# Patient Record
Sex: FEMALE | Race: WHITE | Employment: FULL TIME | URBAN - NONMETROPOLITAN AREA
[De-identification: names, ages, dates, MRNs, and addresses within clinical notes are randomized per-mention and may not be internally consistent; named-entity substitution may affect disease eponyms.]

---

## 2017-01-06 ENCOUNTER — TELEPHONE (OUTPATIENT)
Dept: FAMILY MEDICINE | Facility: OTHER | Age: 42
End: 2017-01-06

## 2017-01-06 NOTE — TELEPHONE ENCOUNTER
Patient is calling and states that she rescheduled her physical for 1/19/16. Patient had labs drawn 12/9. Is there any additional labs that you would like prior to her physical? If yes, please place orders as patient would like to do prior to appointment  .Leann Ann LPN

## 2017-01-19 ENCOUNTER — OFFICE VISIT (OUTPATIENT)
Dept: FAMILY MEDICINE | Facility: OTHER | Age: 42
End: 2017-01-19
Attending: FAMILY MEDICINE
Payer: COMMERCIAL

## 2017-01-19 VITALS
HEIGHT: 64 IN | RESPIRATION RATE: 16 BRPM | BODY MASS INDEX: 31.41 KG/M2 | SYSTOLIC BLOOD PRESSURE: 104 MMHG | HEART RATE: 68 BPM | WEIGHT: 184 LBS | DIASTOLIC BLOOD PRESSURE: 68 MMHG

## 2017-01-19 DIAGNOSIS — N89.8 VAGINAL DISCHARGE: ICD-10-CM

## 2017-01-19 DIAGNOSIS — Z00.00 ROUTINE GENERAL MEDICAL EXAMINATION AT A HEALTH CARE FACILITY: Primary | ICD-10-CM

## 2017-01-19 DIAGNOSIS — E78.1 HIGH TRIGLYCERIDES: ICD-10-CM

## 2017-01-19 LAB
MICRO REPORT STATUS: NORMAL
SPECIMEN SOURCE: NORMAL
WET PREP SPEC: NORMAL

## 2017-01-19 PROCEDURE — 87624 HPV HI-RISK TYP POOLED RSLT: CPT | Performed by: FAMILY MEDICINE

## 2017-01-19 PROCEDURE — 99396 PREV VISIT EST AGE 40-64: CPT | Performed by: FAMILY MEDICINE

## 2017-01-19 PROCEDURE — G0476 HPV COMBO ASSAY CA SCREEN: HCPCS | Performed by: FAMILY MEDICINE

## 2017-01-19 PROCEDURE — 99000 SPECIMEN HANDLING OFFICE-LAB: CPT | Performed by: FAMILY MEDICINE

## 2017-01-19 PROCEDURE — G0123 SCREEN CERV/VAG THIN LAYER: HCPCS | Performed by: FAMILY MEDICINE

## 2017-01-19 PROCEDURE — 87210 SMEAR WET MOUNT SALINE/INK: CPT | Performed by: FAMILY MEDICINE

## 2017-01-19 ASSESSMENT — PAIN SCALES - GENERAL: PAINLEVEL: MILD PAIN (3)

## 2017-01-19 NOTE — NURSING NOTE
"Chief Complaint   Patient presents with     Physical       Initial /68 mmHg  Pulse 68  Resp 16  Ht 5' 4\" (1.626 m)  Wt 184 lb (83.462 kg)  BMI 31.57 kg/m2  LMP 01/12/2017  Breastfeeding? No Estimated body mass index is 31.57 kg/(m^2) as calculated from the following:    Height as of this encounter: 5' 4\" (1.626 m).    Weight as of this encounter: 184 lb (83.462 kg).  BP completed using cuff size: katy Bright    "

## 2017-01-19 NOTE — Clinical Note
[facility] [place]  [address]  [facility] [address]  [phone]      January 30, 2017    Vida Mederos  [address] [place] [id number]          Dear Vida,    I am happy to inform you that your recent cervical cancer screening test (PAP smear) was normal.      Preventative screening such as this helps insure your health for years to come.  This test should be repeated in 1 year unless otherwise directed.    You will still need to return to the clinic every year for your annual exam and other preventive tests.    Please contact the clinic if you have further questions.      Sincerely,    Razia Levy MD

## 2017-01-19 NOTE — PROGRESS NOTES
Female History and Physical     Vida Mederos MRN# [id number]   YOB: 1975 Age: 41 year old     Primary care provider: Razia Levy                 Chief Complaint:   Vaginal discharge and recently threw her back out.    History is obtained from the patient         History of Present Illness:   This patient is a 41 year old female who presents for CPE and above             Past Medical History:     Past Medical History   Diagnosis Date     Threatened premature labor, antepartum 2008     Twin pregnancy, antepartum condition or complication 3/31/2008     Obesity      Gestational diabetes      Lumbar degenerative disc disease      had fallen and was hospitalized for pain             Past Surgical History:     Past Surgical History   Procedure Laterality Date      section  2008     D & c       3 months SAB      section                Gynecologic History:   Patient's last menstrual period was 2017.  Menses:   age of menarche:  11 years old  interval:  4 weeks  duration:  6 day(s)  Contraception:   Abstinence  Sexually transmitted disease history:  none  PAP smear history:  Last PAP was normal.       Last mammogram:  Mammogram was normal.          Obstetrical History:   See Epic         Social History:     Social History   Substance Use Topics     Smoking status: Never Smoker      Smokeless tobacco: Never Used     Alcohol Use: 0.0 oz/week     0 Standard drinks or equivalent per week      Comment: 2x/week             Family History:     Family History   Problem Relation Age of Onset     DIABETES Paternal Grandmother      DIABETES Maternal Grandmother      DIABETES Mother      Prostate Cancer Father      ? vs BPH     GERD Father      has lost teeth     Other Cancer Sister      abnormal cells?     Other - See Comments Sister      hyst     Family History Negative Sister      Family History Negative Sister      Family History Negative Brother      Family History  "Negative Brother              Immunizations:     Immunization History   Administered Date(s) Administered     Pneumococcal 23 valent 03/31/2008     TD (ADULT, 7+) 03/31/2008     TDAP (BOOSTRIX AGES 10-64) 10/22/2012            Allergies:   No Known Allergies          Medications:     Current outpatient prescriptions:      naproxen (NAPROSYN) 500 MG tablet, Take 1 tablet (500 mg) by mouth 2 times daily as needed for moderate pain, Disp: 30 tablet, Rfl: 0            Review of Systems:   A comprehensive, 10 point review of systems was performed and found to be negative except: as above              Physical Exam:   Vitals were reviewed  Blood pressure 104/68, pulse 68, resp. rate 16, height 5' 4\" (1.626 m), weight 184 lb (83.462 kg), last menstrual period 01/12/2017, not currently breastfeeding.  Constitutional:   awake, alert, cooperative, no apparent distress, and appears stated age   Eyes:   Lids and lashes normal, pupils equal, round and reactive to light, extra ocular muscles intact, sclera clear, conjunctiva normal   ENT:   Normocephalic, without obvious abnormality, atramatic, external ears without lesions, oral pharynx with moist mucus membranes, tonsils without erythema or exudates, gums normal and good dentition.   Neck:   Supple, symmetrical, trachea midline, no adenopathy, thyroid symmetric, not enlarged and no tenderness, skin normal   Hematologic / Lymphatic:   no cervical lymphadenopathy   Lungs:   No increased work of breathing, good air exchange, clear to auscultation bilaterally, no crackles or wheezing   Cardiovascular:   Normal apical impulse, regular rate and rhythm, normal S1 and S2, no S3 or S4, and no murmur noted   Abdomen:   surgical scars, tender epigastrium, normal bowel sounds, soft, non-distended, non-tender, no masses palpated, no hepatosplenomegally   Chest / Breast:   Breasts symmetrical, skin without lesion(s), no nipple retraction or dimpling, no nipple discharge, no masses palpated, " no axillary or supraclavicular adenopathy   Genitounirinary:   External Genitalia:  General appearance; normal  Urethra:  Fullness absent  Bladder:  Fullness absent, Masses absent  Vagina:  General appearance normal  Cervix:  General appearance normal, pap done, wet prep done  Uterus:  Size normal, Contour normal  Adenexa:  Masses absent, Tenderness absent   Musculoskeletal:   There is no redness, warmth, or swelling of the joints.  Full range of motion noted.  Motor strength is 5 out of 5 all extremities bilaterally.  Tone is normal.   Neurologic:   Awake, alert, oriented to name, place and time.  Cranial nerves II-XII are grossly intact.  Motor is 5 out of 5 bilaterally.  Sensory is intact.  and gait is normal.   Neuropsychiatric:   General: normal, calm and normal eye contact  Level of consciousness: alert / normal  Affect: normal and pleasant  Orientation: oriented to self, place, time and situation  Memory and insight: normal, memory for past and recent events intact and thought process normal   Skin:   no bruising or bleeding, Body Locations:  No rashes          Data:   No visits with results within 1 Day(s) from this visit.  Latest known visit with results is:    Orders Only on 12/09/2016   Component Date Value Ref Range Status     Cholesterol 12/09/2016 169  <200 mg/dL Final     Triglycerides 12/09/2016 182* <150 mg/dL Final    Comment: Borderline high:  150-199 mg/dl   High:             200-499 mg/dl   Very high:       >499 mg/dl   Fasting specimen       HDL Cholesterol 12/09/2016 55  >49 mg/dL Final     LDL Cholesterol Calculated 12/09/2016 78  <100 mg/dL Final    Desirable:       <100 mg/dl     Non HDL Cholesterol 12/09/2016 114  <130 mg/dL Final     Sodium 12/09/2016 138  133 - 144 mmol/L Final     Potassium 12/09/2016 3.9  3.4 - 5.3 mmol/L Final     Chloride 12/09/2016 104  94 - 109 mmol/L Final     Carbon Dioxide 12/09/2016 26  20 - 32 mmol/L Final     Anion Gap 12/09/2016 8  3 - 14 mmol/L Final      Glucose 12/09/2016 105* 70 - 99 mg/dL Final    Fasting specimen     Urea Nitrogen 12/09/2016 19  7 - 30 mg/dL Final     Creatinine 12/09/2016 0.77  0.52 - 1.04 mg/dL Final     GFR Estimate 12/09/2016 82  >60 mL/min/1.7m2 Final    Non  GFR Calc     GFR Estimate If Black 12/09/2016   >60 mL/min/1.7m2 Final                    Value:>90   GFR Calc       Calcium 12/09/2016 8.8  8.5 - 10.1 mg/dL Final     Bilirubin Total 12/09/2016 0.4  0.2 - 1.3 mg/dL Final     Albumin 12/09/2016 4.0  3.4 - 5.0 g/dL Final     Protein Total 12/09/2016 7.6  6.8 - 8.8 g/dL Final     Alkaline Phosphatase 12/09/2016 72  40 - 150 U/L Final     ALT 12/09/2016 28  0 - 50 U/L Final     AST 12/09/2016 19  0 - 45 U/L Final     Rheumatoid Factor 12/09/2016 <20  <20 IU/mL Final     Cyclic Citrullinated Peptide Antib* 12/09/2016 1  <7 U/mL Final    Negative     TSH 12/09/2016 2.30  0.40 - 4.00 mU/L Final     CK Screen by EIA 12/09/2016   <1.0 Final                    Value:<1.0  Interpretation:  Negative       Hemoglobin A1C 12/09/2016 5.4  4.3 - 6.0 % Final     Vitamin B12 12/09/2016 727  193 - 986 pg/mL Final    Interp: 247-911 = Normal     Estimated Average Glucose 12/09/2016 108   Final   ]         Assessment and Plan:   (Z00.00) Routine general medical examination at a health care facility  (primary encounter diagnosis)  Comment:   Plan: A pap thin layer screen with  HPV - recommended        age 30 - 65 years (select HPV order below), HPV        High Risk Types DNA Cervical        F/u 1 yr    (N89.8) Vaginal discharge  Comment:   Plan: Wet prep          (E78.1) High triglycerides  Comment:   Plan: increase fish oil    GERD  Plan:  Zantac or prilosec prn  F/u if not improving.  Anti-inflammatory diet       Patient was agreeable to this plan and had no further questions.    Razia Levy MD  1/19/2017  1:37 PM

## 2017-01-19 NOTE — MR AVS SNAPSHOT
"              After Visit Summary   2017    Vida Mederos    MRN: [id number]           Patient Information     Date Of Birth          1975        Visit Information        Provider Department      2017 1:30 PM Razia Levy MD [facility]        Today's Diagnoses     Routine general medical examination at a health care facility    -  1     Vaginal discharge         High triglycerides           Care Instructions    Whole 30        Follow-ups after your visit        Who to contact     If you have questions or need follow up information about today's clinic visit or your schedule please contact [facility] directly at [phone].  Normal or non-critical lab and imaging results will be communicated to you by GainSpanhart, letter or phone within 4 business days after the clinic has received the results. If you do not hear from us within 7 days, please contact the clinic through GainSpanhart or phone. If you have a critical or abnormal lab result, we will notify you by phone as soon as possible.  Submit refill requests through Corsair or call your pharmacy and they will forward the refill request to us. Please allow 3 business days for your refill to be completed.          Additional Information About Your Visit        MyChart Information     Corsair lets you send messages to your doctor, view your test results, renew your prescriptions, schedule appointments and more. To sign up, go to [email or web address] . Click on \"Log in\" on the left side of the screen, which will take you to the Welcome page. Then click on \"Sign up Now\" on the right side of the page.     You will be asked to enter the access code listed below, as well as some personal information. Please follow the directions to create your username and password.     Your access code is: [email or web address]  Expires: 3/8/2017  3:19 PM     Your access code will  in 90 days. If you need help or a new code, please call your " "[facility] or [phone].        Care EveryWhere ID     This is your Care EveryWhere ID. This could be used by other organizations to access your [facility] medical records  [id number]        Your Vitals Were     Pulse Respirations Height BMI (Body Mass Index) Last Period Breastfeeding?    68 16 5' 4\" (1.626 m) 31.57 kg/m2 01/12/2017 No       Blood Pressure from Last 3 Encounters:   01/19/17 104/68   12/08/16 106/66   06/03/16 102/64    Weight from Last 3 Encounters:   01/19/17 184 lb (83.462 kg)   12/08/16 189 lb (85.73 kg)   06/03/16 185 lb (83.915 kg)              We Performed the Following     A pap thin layer screen with  HPV - recommended age 30 - 65 years (select HPV order below)     HPV High Risk Types DNA Cervical     Wet prep          Today's Medication Changes          These changes are accurate as of: 1/19/17  2:00 PM.  If you have any questions, ask your nurse or doctor.               Stop taking these medicines if you haven't already. Please contact your care team if you have questions.     vitamin D 01370 UNIT capsule   Commonly known as:  ERGOCALCIFEROL   Stopped by:  Razia Levy MD                    Primary Care Provider Office Phone # Fax #    Razia Levy [phone]       [facility] [place] [address] [place] [address]  [facility] [address]        Thank you!     Thank you for choosing [facility]  for your care. Our goal is always to provide you with excellent care. Hearing back from our patients is one way we can continue to improve our services. Please take a few minutes to complete the written survey that you may receive in the mail after your visit with us. Thank you!             Your Updated Medication List - Protect others around you: Learn how to safely use, store and throw away your medicines at www.disposemymeds.org.          This list is accurate as of: 1/19/17  2:00 PM.  Always use your most recent med list.                   Brand Name Dispense " Instructions for use    naproxen 500 MG tablet    NAPROSYN    30 tablet    Take 1 tablet (500 mg) by mouth 2 times daily as needed for moderate pain

## 2017-01-23 DIAGNOSIS — Z12.31 VISIT FOR SCREENING MAMMOGRAM: Primary | ICD-10-CM

## 2017-01-23 PROCEDURE — G0202 SCR MAMMO BI INCL CAD: HCPCS | Mod: TC

## 2017-01-30 LAB
COPATH REPORT: NORMAL
PAP: NORMAL

## 2017-01-31 LAB
FINAL DIAGNOSIS: NORMAL
HPV HR 12 DNA CVX QL NAA+PROBE: NEGATIVE
HPV16 DNA SPEC QL NAA+PROBE: NEGATIVE
HPV18 DNA SPEC QL NAA+PROBE: NEGATIVE
SPECIMEN DESCRIPTION: NORMAL

## 2017-12-19 ENCOUNTER — OFFICE VISIT (OUTPATIENT)
Dept: FAMILY MEDICINE | Facility: OTHER | Age: 42
End: 2017-12-19
Attending: FAMILY MEDICINE
Payer: COMMERCIAL

## 2017-12-19 VITALS
TEMPERATURE: 98.3 F | SYSTOLIC BLOOD PRESSURE: 108 MMHG | HEART RATE: 78 BPM | DIASTOLIC BLOOD PRESSURE: 72 MMHG | BODY MASS INDEX: 33.99 KG/M2 | WEIGHT: 198 LBS | OXYGEN SATURATION: 98 %

## 2017-12-19 DIAGNOSIS — M26.609 TMJ (TEMPOROMANDIBULAR JOINT SYNDROME): Primary | ICD-10-CM

## 2017-12-19 PROCEDURE — 99212 OFFICE O/P EST SF 10 MIN: CPT

## 2017-12-19 PROCEDURE — 99213 OFFICE O/P EST LOW 20 MIN: CPT | Performed by: FAMILY MEDICINE

## 2017-12-19 ASSESSMENT — PAIN SCALES - GENERAL: PAINLEVEL: MODERATE PAIN (4)

## 2017-12-19 NOTE — PROGRESS NOTES
SUBJECTIVE:   Vida Mederos is a 42 year old female who presents to clinic today for the following health issues:      Ear Pain      Duration: started today    Description (location/character/radiation): right side, upper jaw pain- patient believes it her ear. Did go swimming yesterday    Intensity:  moderate    Accompanying signs and symptoms: headache, pain with chewing     History (similar episodes/previous evaluation): None    Precipitating or alleviating factors: None    Therapies tried and outcome: acetaminophen 500mg    Denies any fever or sore throat.  No pain in her teeth chewing but pain is just anterior to the right ear.  No ear drainage or ringing in the ears or weakness in her face       [facility]    Vida Mederos, 42 year old, female presents with   Chief Complaint   Patient presents with     Otalgia       PAST MEDICAL HISTORY:  Past Medical History:   Diagnosis Date     Gestational diabetes      Lumbar degenerative disc disease     had fallen and was hospitalized for pain     Obesity      Threatened premature labor, antepartum(644.03) 2008     Twin pregnancy, antepartum condition or complication 3/31/2008       PAST SURGICAL HISTORY:  Past Surgical History:   Procedure Laterality Date      SECTION  2008      SECTION       D & C      3 months SAB       MEDICATIONS:  Prior to Admission medications    Medication Sig Start Date End Date Taking? Authorizing Provider   naproxen (NAPROSYN) 500 MG tablet Take 1 tablet (500 mg) by mouth 2 times daily as needed for moderate pain 16  Yes Razia Levy MD       ALLERGIES:   No Known Allergies    ROS:  Constitutional, HEENT, cardiovascular, pulmonary, gi and gu systems are negative, except as otherwise noted.        EXAM:/72 (BP Location: Left arm, Patient Position: Chair, Cuff Size: Adult Large)  Pulse 78  Temp 98.3  F (36.8  C) (Tympanic)  Wt 198 lb (89.8 kg)  SpO2 98%  BMI 33.99  kg/m2 Body mass index is 33.99 kg/(m^2).   GENERAL APPEARANCE: healthy, alert and no distress  EYES: Eyes grossly normal to inspection, PERRL and conjunctivae and sclerae normal  HENT: ear canals and TM's normal and nose and mouth without ulcers or lesions  NECK: no adenopathy, no asymmetry, masses, or scars and thyroid normal to palpation  RESP: lungs clear to auscultation - no rales, rhonchi or wheezes  CV: regular rates and rhythm, normal S1 S2, no S3 or S4 and no murmur, click or rub  Lab/ X-ray  No results found for this or any previous visit (from the past 24 hour(s)).    ASSESSMENT/PLAN:    ICD-10-CM    1. TMJ (temporomandibular joint syndrome) M26.609  she will rest from chewing a lot and take ibuprofen 2 tablets of the 200 mg 3 times a day with food for 5 days.  She can try hot packs on the right side.  Follow-up if symptoms progress despite treatment or do not resolve after treatment.  Note she had no facial weakness and no cervical adenopathy and no swelling anterior to the ear or redness and auditory canal had minimal redness but the TM looked totally normal         LEORA Aponte MD  December 19, 2017

## 2017-12-19 NOTE — MR AVS SNAPSHOT
"              After Visit Summary   2017    Vida eMderos    MRN: [id number]           Patient Information     Date Of Birth          1975        Visit Information        Provider Department      2017 2:45 PM LAURI Aponte MD [facility]        Care Instructions    Ibuprofen 200 mg three times a day for 5 days, rest the Lutheran, hot packs,          Follow-ups after your visit        Who to contact     If you have questions or need follow up information about today's clinic visit or your schedule please contact [facility] directly at [phone].  Normal or non-critical lab and imaging results will be communicated to you by Narrablehart, letter or phone within 4 business days after the clinic has received the results. If you do not hear from us within 7 days, please contact the clinic through Narrablehart or phone. If you have a critical or abnormal lab result, we will notify you by phone as soon as possible.  Submit refill requests through Sosh or call your pharmacy and they will forward the refill request to us. Please allow 3 business days for your refill to be completed.          Additional Information About Your Visit        MyChart Information     Sosh lets you send messages to your doctor, view your test results, renew your prescriptions, schedule appointments and more. To sign up, go to [email or web address] . Click on \"Log in\" on the left side of the screen, which will take you to the Welcome page. Then click on \"Sign up Now\" on the right side of the page.     You will be asked to enter the access code listed below, as well as some personal information. Please follow the directions to create your username and password.     Your access code is: [email or web address]  Expires: 3/19/2018  2:48 PM     Your access code will  in 90 days. If you need help or a new code, please call your [facility] or [phone].        Care EveryWhere ID     This is your Care " EveryWhere ID. This could be used by other organizations to access your [facility] medical records  [id number]        Your Vitals Were     Pulse Temperature Pulse Oximetry BMI (Body Mass Index)          78 98.3  F (36.8  C) (Tympanic) 98% 33.99 kg/m2         Blood Pressure from Last 3 Encounters:   12/19/17 108/72   01/19/17 104/68   12/08/16 106/66    Weight from Last 3 Encounters:   12/19/17 198 lb (89.8 kg)   01/19/17 184 lb (83.5 kg)   12/08/16 189 lb (85.7 kg)              Today, you had the following     No orders found for display       Primary Care Provider Office Phone # Fax #    Razia Levy [phone]       [facility] [place] [address]  [facility] [place] [address]        Equal Access to Services     [facility]: Hadii aad ku hadasho Soomaali, waaxda luqadaha, qaybta kaalmada adeegyada, waxay idiin hayaan elsi stevenson . So [facility] [phone].    ATENCIÓN: Si habla español, tiene a rubio disposición servicios gratuitos de asistencia lingüística. Llame al [phone].    We comply with applicable federal civil rights laws and [place] laws. We do not discriminate on the basis of race, color, national origin, age, disability, sex, sexual orientation, or gender identity.            Thank you!     Thank you for choosing [facility]  for your care. Our goal is always to provide you with excellent care. Hearing back from our patients is one way we can continue to improve our services. Please take a few minutes to complete the written survey that you may receive in the mail after your visit with us. Thank you!             Your Updated Medication List - Protect others around you: Learn how to safely use, store and throw away your medicines at www.disposemymeds.org.          This list is accurate as of: 12/19/17  2:48 PM.  Always use your most recent med list.                   Brand Name Dispense Instructions for use Diagnosis    naproxen 500 MG tablet    NAPROSYN    30  tablet    Take 1 tablet (500 mg) by mouth 2 times daily as needed for moderate pain    Pain in joint, multiple sites

## 2017-12-19 NOTE — NURSING NOTE
"Chief Complaint   Patient presents with     Otalgia       Initial /72 (BP Location: Left arm, Patient Position: Chair, Cuff Size: Adult Large)  Pulse 78  Temp 98.3  F (36.8  C) (Tympanic)  Wt 198 lb (89.8 kg)  SpO2 98%  BMI 33.99 kg/m2 Estimated body mass index is 33.99 kg/(m^2) as calculated from the following:    Height as of 1/19/17: 5' 4\" (1.626 m).    Weight as of this encounter: 198 lb (89.8 kg).  Medication Reconciliation: completecomplete  YANIQUE GAN       "

## 2018-03-06 ENCOUNTER — HOSPITAL ENCOUNTER (EMERGENCY)
Facility: HOSPITAL | Age: 43
Discharge: HOME | End: 2018-03-06
Attending: NURSE PRACTITIONER | Admitting: NURSE PRACTITIONER
Payer: COMMERCIAL

## 2018-03-06 VITALS
SYSTOLIC BLOOD PRESSURE: 125 MMHG | DIASTOLIC BLOOD PRESSURE: 86 MMHG | OXYGEN SATURATION: 100 % | RESPIRATION RATE: 14 BRPM | TEMPERATURE: 97 F

## 2018-03-06 DIAGNOSIS — M26.609 TEMPOROMANDIBULAR JOINT DISORDER: ICD-10-CM

## 2018-03-06 PROCEDURE — 99213 OFFICE O/P EST LOW 20 MIN: CPT | Performed by: NURSE PRACTITIONER

## 2018-03-06 PROCEDURE — G0463 HOSPITAL OUTPT CLINIC VISIT: HCPCS

## 2018-03-06 ASSESSMENT — ENCOUNTER SYMPTOMS
FACIAL SWELLING: 0
FEVER: 0
COUGH: 0
RHINORRHEA: 0

## 2018-03-06 NOTE — ED NOTES
Bilateral ear pain Lt>Rt, started on Sunday, Lt ear pain 7/10, Rt ear not hurting at this time. Ibuprofen 400 mg at 1630.

## 2018-03-06 NOTE — ED AVS SNAPSHOT
[facility]    [address]    [place] [address]    Phone:  [phone]                                       Vida Mederos   MRN: [id number]    Department:  [facility]   Date of Visit:  3/6/2018           Patient Information     Date Of Birth          1975        Your diagnoses for this visit were:     Temporomandibular joint disorder        You were seen by Shayy Goodman NP.      Follow-up Information     Please follow up.    Why:  See Dentist if not improving        Discharge Instructions         Take ibuprofen 3 times a day with food.   Warm pack 3 times a day for 5-10 minutes.   Decrease stress.   Look into over the counter mouth guard for sleeping.   Follow up with dentist if not improving.     Self-Care for Temporomandibular Disorders (TMD)  You have temporomandibular disorder (TMD). This term describes a group of problems related to the temporomandibular joint (TMJ) and nearby muscles. The TMJ is located where the upper and lower jaws meet. Treatment will get your jaw back to normal function. But your care doesn t end there. Once you ve had TMD, it s important to avoid reinjury. Get in the habit of doing self-checks. This can make you aware of any symptoms that begin to return, so you can take action right away.    Doing self-checks  Make it a habit to assess your body a few times each day. Try writing yourself a reminder. Or set an alarm on your watch or computer. When doing a self-check, ask yourself:    Do I feel stressed?    Are my muscles tense?    Am I grinding or clenching my teeth?    Is my posture healthy for my body?    Is there anything I can do to make myself more comfortable?  If you answer  yes  to any of the questions above, you need to take action. Adjusting your posture or taking a short break can help prevent or relieve TMD symptoms.  Listening to your body  Many people get used to ignoring pain. But pain is a signal that your body  needs care. To maintain your TMJ health:    Avoid hard or chewy foods. Even if you feel fine, eating such foods can trigger symptoms again.    Be aware of your body. Don t ignore TMD symptoms. The nagging pain in your neck or jaw may be a sign that you need care.    Be sure to keep follow-up appointments with your health care team.  Managing stress  Stress is a key factor in TMD. Stress can cause you to clench your muscles or grind your teeth. It can also affect your sleep, reducing your body s ability to heal. Here are a few tips to manage stress:    Learn ways to relax. Try listening to music or gently stretching. Take a few slow deep breaths. Or, close your eyes and imagine a place or object that is calming.    Get plenty of rest and sleep.    Set goals you know you can attain.    Make time for people and things you enjoy.    Ask for help if you need it. Friends and family can run errands and cook meals for you.   Staying active  Activity helps the body in many ways. You stay looser and more relaxed. It also helps keep muscles and tissues conditioned. That way you can heal faster and make reinjury less likely. Here are some tips to get you started:    Talk to your health care provider before starting an exercise program.    Always warm up and stretch before each activity. This helps prevent injury.    Try walking or swimming. These activities are easy on your joints. They also benefit your heart and lungs.    Try yoga or yuliet chi. These are relaxing activities known for reducing stress.  Date Last Reviewed: 7/13/2015 2000-2017 [employer]. [address] [place] [address], [place] [address]. All rights reserved. This information is not intended as a substitute for professional medical care. Always follow your healthcare professional's instructions.             Review of your medicines      Our records show that you are taking the medicines listed below. If these are incorrect, please call your family doctor  "or clinic.        Dose / Directions Last dose taken    IBUPROFEN PO   Dose:  400 mg        Take 400 mg by mouth as needed for moderate pain   Refills:  0                Orders Needing Specimen Collection     None      Pending Results     No orders found from 3/4/2018 to 3/7/2018.            Pending Culture Results     No orders found from 3/4/2018 to 3/7/2018.            Thank you for choosing [facility]       Thank you for choosing [facility] for your care. Our goal is always to provide you with excellent care. Hearing back from our patients is one way we can continue to improve our services. Please take a few minutes to complete the written survey that you may receive in the mail after you visit with us. Thank you!        [employer] Information     [facility] lets you send messages to your doctor, view your test results, renew your prescriptions, schedule appointments and more. To sign up, go to [email or web address] . Click on \"Log in\" on the left side of the screen, which will take you to the Welcome page. Then click on \"Sign up Now\" on the right side of the page.     You will be asked to enter the access code listed below, as well as some personal information. Please follow the directions to create your username and password.     Your access code is: [email or web address]  Expires: 3/19/2018  2:48 PM     Your access code will  in 90 days. If you need help or a new code, please call your [facility] clinic or [phone].        Care EveryWhere ID     This is your Care EveryWhere ID. This could be used by other organizations to access your [facility] medical records  [id number]        Equal Access to Services     JERMAINE VILLEGAS : Hadmaryann Cornelius, waaxda luqadaha, qaybta kaalmada dania, marcela seo. So [facility] [place] [facility] [phone].    ATENCIÓN: Si habla español, tiene a rubio disposición servicios gratuitos de asistencia lingüística. Llame al [phone].    We comply with applicable federal " civil rights laws and [place] laws. We do not discriminate on the basis of race, color, national origin, age, disability, sex, sexual orientation, or gender identity.            After Visit Summary       This is your record. Keep this with you and show to your community pharmacist(s) and doctor(s) at your next visit.

## 2018-03-06 NOTE — ED AVS SNAPSHOT
[facility]    [address] [place] [address]    Phone:  [phone]                                       Vida Mederos   MRN: [id number]    Department[facility]  [facility]   Date of Visit:  3/6/2018           After Visit Summary Signature Page     I have received my discharge instructions, and my questions have been answered. I have discussed any challenges I see with this plan with the nurse or doctor.    ..........................................................................................................................................  Patient/Patient Representative Signature      ..........................................................................................................................................  Patient Representative Print Name and Relationship to Patient    ..................................................               ................................................  Date                                            Time    ..........................................................................................................................................  Reviewed by Signature/Title    ...................................................              ..............................................  Date                                                            Time

## 2018-03-07 NOTE — DISCHARGE INSTRUCTIONS
Take ibuprofen 3 times a day with food.   Warm pack 3 times a day for 5-10 minutes.   Decrease stress.   Look into over the counter mouth guard for sleeping.   Follow up with dentist if not improving.     Self-Care for Temporomandibular Disorders (TMD)  You have temporomandibular disorder (TMD). This term describes a group of problems related to the temporomandibular joint (TMJ) and nearby muscles. The TMJ is located where the upper and lower jaws meet. Treatment will get your jaw back to normal function. But your care doesn t end there. Once you ve had TMD, it s important to avoid reinjury. Get in the habit of doing self-checks. This can make you aware of any symptoms that begin to return, so you can take action right away.    Doing self-checks  Make it a habit to assess your body a few times each day. Try writing yourself a reminder. Or set an alarm on your watch or computer. When doing a self-check, ask yourself:    Do I feel stressed?    Are my muscles tense?    Am I grinding or clenching my teeth?    Is my posture healthy for my body?    Is there anything I can do to make myself more comfortable?  If you answer  yes  to any of the questions above, you need to take action. Adjusting your posture or taking a short break can help prevent or relieve TMD symptoms.  Listening to your body  Many people get used to ignoring pain. But pain is a signal that your body needs care. To maintain your TMJ health:    Avoid hard or chewy foods. Even if you feel fine, eating such foods can trigger symptoms again.    Be aware of your body. Don t ignore TMD symptoms. The nagging pain in your neck or jaw may be a sign that you need care.    Be sure to keep follow-up appointments with your health care team.  Managing stress  Stress is a key factor in TMD. Stress can cause you to clench your muscles or grind your teeth. It can also affect your sleep, reducing your body s ability to heal. Here are a few tips to manage  stress:    Learn ways to relax. Try listening to music or gently stretching. Take a few slow deep breaths. Or, close your eyes and imagine a place or object that is calming.    Get plenty of rest and sleep.    Set goals you know you can attain.    Make time for people and things you enjoy.    Ask for help if you need it. Friends and family can run errands and cook meals for you.   Staying active  Activity helps the body in many ways. You stay looser and more relaxed. It also helps keep muscles and tissues conditioned. That way you can heal faster and make reinjury less likely. Here are some tips to get you started:    Talk to your health care provider before starting an exercise program.    Always warm up and stretch before each activity. This helps prevent injury.    Try walking or swimming. These activities are easy on your joints. They also benefit your heart and lungs.    Try yoga or yuliet chi. These are relaxing activities known for reducing stress.  Date Last Reviewed: 7/13/2015 2000-2017 [employer]. [address] [place] [address], [place] [address]. All rights reserved. This information is not intended as a substitute for professional medical care. Always follow your healthcare professional's instructions.

## 2018-03-07 NOTE — ED PROVIDER NOTES
History     Chief Complaint   Patient presents with     Otalgia     lt ear pain     The history is provided by the patient. No  was used.     Vida Mederos is a 42 year old female who presents with bilateral ear pain, left much worse than right for past several days. Took a medication and oil drops at home that helped the right ear. No fever, no sinus congestion. No cough.     Problem List:    Patient Active Problem List    Diagnosis Date Noted     High triglycerides 2017     Priority: Medium     Pain in joint, multiple sites 2016     Priority: Medium     Encounter to establish care 2016     Priority: Medium     ACP (advance care planning) 2016     Priority: Medium     Advance Care Planning 6/3/2016: ACP Review of Chart / Resources Provided:  Reviewed chart for advance care plan.  Vida Mederos has no plan or code status on file. Discussed available resources and provided with information. Confirmed code status reflects current choices pending further ACP discussions.  Confirmed/documented legally designated decision makers.  Added by Kanika Biswas                Past Medical History:    Past Medical History:   Diagnosis Date     Gestational diabetes      Lumbar degenerative disc disease      Obesity      Threatened premature labor, antepartum(644.03) 2008     Twin pregnancy, antepartum condition or complication 3/31/2008       Past Surgical History:    Past Surgical History:   Procedure Laterality Date      SECTION  2008      SECTION       D & C  1999    3 months SAB       Family History:    Family History   Problem Relation Age of Onset     DIABETES Mother      Prostate Cancer Father      ? vs BPH     GERD Father      has lost teeth     DIABETES Maternal Grandmother      DIABETES Paternal Grandmother      Other Cancer Sister      abnormal cells?     Other - See Comments Sister      hyst     Family History Negative Sister       Family History Negative Sister      Family History Negative Brother      Family History Negative Brother        Social History:  Marital Status:   [2]  Social History   Substance Use Topics     Smoking status: Never Smoker     Smokeless tobacco: Never Used     Alcohol use 0.0 oz/week     0 Standard drinks or equivalent per week      Comment: twice a month        Medications:      IBUPROFEN PO         Review of Systems   Constitutional: Negative for fever.   HENT: Positive for ear pain. Negative for facial swelling, hearing loss, mouth sores, postnasal drip and rhinorrhea.    Respiratory: Negative for cough.        Physical Exam   BP: 125/86  Heart Rate: 62  Temp: 97  F (36.1  C)  Resp: 14  SpO2: 100 %      Physical Exam   Constitutional: She appears well-developed and well-nourished. No distress.   HENT:   Head: Normocephalic and atraumatic.       Right Ear: Tympanic membrane and external ear normal.   Left Ear: Tympanic membrane and external ear normal.   Nose: Nose normal. Right sinus exhibits no maxillary sinus tenderness and no frontal sinus tenderness. Left sinus exhibits no maxillary sinus tenderness and no frontal sinus tenderness.   Mouth/Throat: Uvula is midline, oropharynx is clear and moist and mucous membranes are normal. No oral lesions. There is trismus in the jaw. No dental abscesses or dental caries. No oropharyngeal exudate.   Eyes: Conjunctivae and lids are normal. No scleral icterus.   Neck: Normal range of motion. Neck supple.   Cardiovascular: Normal rate.    Pulmonary/Chest: Effort normal.   Skin: She is not diaphoretic.   Nursing note and vitals reviewed.      ED Course     ED Course     Procedures            Labs Ordered and Resulted from Time of ED Arrival Up to the Time of Departure from the ED - No data to display    Assessments & Plan (with Medical Decision Making)     I have reviewed the nursing notes.  I have reviewed the findings, diagnosis, plan and need for follow up with the  patient.  History of TMJ. TMs are clear, afebrile, no URI sx.  Treat as MSK.   Given Epic educational materials.     New Prescriptions    No medications on file     Final diagnoses:   Temporomandibular joint disorder     Take OTC ibuprofen TID, warm packs to face 5-10 min TID.   Get an OTC mouth guard for sleep at night.   Decrease stress.   F/u with dentist for re-evaluation.       3/6/2018   [facility]     Shayy Goodman NP  03/06/18 1920

## 2018-05-28 ENCOUNTER — HOSPITAL ENCOUNTER (EMERGENCY)
Facility: HOSPITAL | Age: 43
Discharge: HOME | End: 2018-05-28
Attending: NURSE PRACTITIONER | Admitting: NURSE PRACTITIONER
Payer: COMMERCIAL

## 2018-05-28 VITALS
SYSTOLIC BLOOD PRESSURE: 131 MMHG | RESPIRATION RATE: 14 BRPM | DIASTOLIC BLOOD PRESSURE: 76 MMHG | OXYGEN SATURATION: 97 % | TEMPERATURE: 98.9 F

## 2018-05-28 DIAGNOSIS — H81.10 BENIGN PAROXYSMAL POSITIONAL VERTIGO, UNSPECIFIED LATERALITY: ICD-10-CM

## 2018-05-28 PROCEDURE — 99213 OFFICE O/P EST LOW 20 MIN: CPT | Performed by: NURSE PRACTITIONER

## 2018-05-28 PROCEDURE — G0463 HOSPITAL OUTPT CLINIC VISIT: HCPCS

## 2018-05-28 PROCEDURE — 25000131 ZZH RX MED GY IP 250 OP 636 PS 637: Performed by: NURSE PRACTITIONER

## 2018-05-28 RX ORDER — MECLIZINE HYDROCHLORIDE 25 MG/1
25 TABLET ORAL ONCE
Status: COMPLETED | OUTPATIENT
Start: 2018-05-28 | End: 2018-05-28

## 2018-05-28 RX ORDER — MECLIZINE HYDROCHLORIDE 25 MG/1
25 TABLET ORAL 3 TIMES DAILY PRN
Qty: 10 TABLET | Refills: 0 | Status: SHIPPED | OUTPATIENT
Start: 2018-05-28 | End: 2018-06-03

## 2018-05-28 RX ADMIN — MECLIZINE HYDROCHLORIDE 25 MG: 25 TABLET ORAL at 16:29

## 2018-05-28 NOTE — ED AVS SNAPSHOT
[facility]    [address]    [place] [address]    Phone:  [phone]                                       Vida Mederos   MRN: [id number]    Department[facility]   Date of Visit:  5/28/2018           Patient Information     Date Of Birth          1975        Your diagnoses for this visit were:     Benign paroxysmal positional vertigo, unspecified laterality        You were seen by Alyse Kraft NP.      Follow-up Information     Follow up with Razia Levy MD In 2 days.    Specialty:  Family Practice    Why:  For re-evaluation.     Contact information:    [facility] [place]  [address]  [place] [address] [phone]          Follow up with [facility].    Specialty:  EMERGENCY MEDICINE    Why:  As needed, If symptoms worsen    Contact information:    [address]  [place] [address] [phone]    Additional information:    From [facility]: Take [address]. Turn left at [address]. Turn left at the first stoplight on [address]. At the first stop sign, take a right onto [address]. Take a left into the parking lot and continue through until you reach the North enterance of the building.       From [place]: Take [address]. Take the [address] ramp towards [place]. Turn left onto [address]. Take a slight right onto [address]. Turn left at the first stoplight on [address]. At the first stop sign, take a right onto [address]. Take a left into the parking lot and continue through until you reach the North enterance of the building.       From [place]: Take [address]. Take a right at [address]. At the first stop sign, take a right onto [address]. Take a left into the parking lot and continue through until you reach the North enterance of the building.         Discharge Instructions       Take Meclizine as needed as  "ordered.   Follow up with PCP this week.   Return to urgent care or emergency department with any increase in symptoms or concerns.     Discharge References/Attachments     BENIGN PAROXYSMAL POSITIONAL VERTIGO (ENGLISH)         Review of your medicines      START taking        Dose / Directions Last dose taken    meclizine 25 MG tablet   Commonly known as:  ANTIVERT   Dose:  25 mg   Quantity:  10 tablet        Take 1 tablet (25 mg) by mouth 3 times daily as needed for dizziness   Refills:  0          Our records show that you are taking the medicines listed below. If these are incorrect, please call your family doctor or clinic.        Dose / Directions Last dose taken    IBUPROFEN PO   Dose:  400 mg        Take 400 mg by mouth as needed for moderate pain   Refills:  0                Prescriptions were sent or printed at these locations (1 Prescription)                   [id number] - [place], [place] - [address] [address] [facility] [address]   [address], [place] [address]    Telephone:  [phone]   Fax:  [phone]   Hours:                  E-Prescribed (1 of 1)         meclizine (ANTIVERT) 25 MG tablet                Orders Needing Specimen Collection     None      Pending Results     No orders found from 5/26/2018 to 5/29/2018.            Pending Culture Results     No orders found from 5/26/2018 to 5/29/2018.            Thank you for choosing [facility]       Thank you for choosing [facility] for your care. Our goal is always to provide you with excellent care. Hearing back from our patients is one way we can continue to improve our services. Please take a few minutes to complete the written survey that you may receive in the mail after you visit with us. Thank you!        [employer] Information     [employer] lets you send messages to your doctor, view your test results, renew your prescriptions, schedule appointments and more. To sign up, go to [email or web address] . Click on \"Log in\" on the left side of the screen, which will " "take you to the Welcome page. Then click on \"Sign up Now\" on the right side of the page.     You will be asked to enter the access code listed below, as well as some personal information. Please follow the directions to create your username and password.     Your access code is: [email or web address]  Expires: 2018  5:01 PM     Your access code will  in 90 days. If you need help or a new code, please call your [facility] clinic or [phone].        Care EveryWhere ID     This is your Care EveryWhere ID. This could be used by other organizations to access your [facility] medical records  [id number]        Equal Access to Services     TRISH VILLEGAS : Devante Cornelius, jasvir flood, susi najera, marcela seo. So [facility] [phone].    ATENCIÓN: Si habla español, tiene a rubio disposición servicios gratuitos de asistencia lingüística. Llame al [phone].    We comply with applicable federal civil rights laws and [place] laws. We do not discriminate on the basis of race, color, national origin, age, disability, sex, sexual orientation, or gender identity.            After Visit Summary       This is your record. Keep this with you and show to your community pharmacist(s) and doctor(s) at your next visit.                  "

## 2018-05-28 NOTE — ED AVS SNAPSHOT
[facility]    [address] [place] [address]    Phone:  [phone]                                       Vida Mederos   MRN: [id number]    Department[facility]   Date of Visit:  5/28/2018           After Visit Summary Signature Page     I have received my discharge instructions, and my questions have been answered. I have discussed any challenges I see with this plan with the nurse or doctor.    ..........................................................................................................................................  Patient/Patient Representative Signature      ..........................................................................................................................................  Patient Representative Print Name and Relationship to Patient    ..................................................               ................................................  Date                                            Time    ..........................................................................................................................................  Reviewed by Signature/Title    ...................................................              ..............................................  Date                                                            Time

## 2018-05-28 NOTE — DISCHARGE INSTRUCTIONS
Take Meclizine as needed as ordered.   Follow up with PCP this week.   Return to urgent care or emergency department with any increase in symptoms or concerns.

## 2018-05-28 NOTE — ED PROVIDER NOTES
History     Chief Complaint   Patient presents with     Dizziness     c/o dizziness with movement. notes needs to hold her head still to prevent the dizziness     The history is provided by the patient. No  was used.     Vida Mederos is a 42 year old female who presents with dizziness with movement that started yesterday evening after playing Frisbee golf. Denies recent head injury or trauma. She's taken ibuprofen and dramamine with mild effectiveness. She's increased water intake. No recent illness. Denies fever, chills, or night sweats. Decreased appeite. Bowel and bladder are working well.     Problem List:    Patient Active Problem List    Diagnosis Date Noted     High triglycerides 2017     Priority: Medium     Pain in joint, multiple sites 2016     Priority: Medium     Encounter to establish care 2016     Priority: Medium     ACP (advance care planning) 2016     Priority: Medium     Advance Care Planning 6/3/2016: ACP Review of Chart / Resources Provided:  Reviewed chart for advance care plan.  Vida Mederos has no plan or code status on file. Discussed available resources and provided with information. Confirmed code status reflects current choices pending further ACP discussions.  Confirmed/documented legally designated decision makers.  Added by Kanika Biswas                Past Medical History:    Past Medical History:   Diagnosis Date     Gestational diabetes      Lumbar degenerative disc disease      Obesity      Threatened premature labor, antepartum(644.03) 2008     Twin pregnancy, antepartum condition or complication 3/31/2008       Past Surgical History:    Past Surgical History:   Procedure Laterality Date      SECTION  2008      SECTION       D & C  1999    3 months SAB       Family History:    Family History   Problem Relation Age of Onset     DIABETES Mother      Prostate Cancer Father      ? vs BPH     GERD  Father      has lost teeth     DIABETES Maternal Grandmother      DIABETES Paternal Grandmother      Other Cancer Sister      abnormal cells?     Other - See Comments Sister      hyst     Family History Negative Sister      Family History Negative Sister      Family History Negative Brother      Family History Negative Brother        Social History:  Marital Status:   [2]  Social History   Substance Use Topics     Smoking status: Never Smoker     Smokeless tobacco: Never Used     Alcohol use 0.0 oz/week     0 Standard drinks or equivalent per week      Comment: twice a month        Medications:      IBUPROFEN PO   meclizine (ANTIVERT) 25 MG tablet         Review of Systems   Constitutional: Positive for appetite change. Negative for activity change, chills and fever.   HENT: Negative for congestion, ear discharge, ear pain, postnasal drip, rhinorrhea, sinus pressure, sore throat and trouble swallowing.    Respiratory: Negative for cough.    Cardiovascular: Negative for chest pain.   Gastrointestinal: Negative for abdominal pain, diarrhea, nausea and vomiting.   Genitourinary: Negative for dysuria.   Skin: Negative for rash.   Neurological: Positive for dizziness. Negative for tremors, syncope, facial asymmetry, speech difficulty, weakness, numbness and headaches.   Psychiatric/Behavioral: Negative.        Physical Exam   BP: 131/76  Heart Rate: 95  Temp: 98.9  F (37.2  C)  Resp: 14  SpO2: 97 %      Physical Exam   Constitutional: She is oriented to person, place, and time. She appears well-developed and well-nourished. No distress.   HENT:   Head: Normocephalic.   Right Ear: External ear normal.   Left Ear: External ear normal.   Mouth/Throat: Oropharynx is clear and moist. No oropharyngeal exudate.   Eyes: Conjunctivae and EOM are normal. Pupils are equal, round, and reactive to light. Right eye exhibits no discharge. Left eye exhibits no discharge.   Neck: Normal range of motion. Neck supple.    Cardiovascular: Normal rate, regular rhythm, normal heart sounds and intact distal pulses.    No murmur heard.  Pulmonary/Chest: Effort normal. No respiratory distress. She has no wheezes. She has no rales.   Abdominal: Soft. She exhibits no distension.   Musculoskeletal: Normal range of motion.   Lymphadenopathy:     She has no cervical adenopathy.   Neurological: She is alert and oriented to person, place, and time. She displays normal reflexes. No cranial nerve deficit. She exhibits normal muscle tone. Coordination normal.   CN II-XII grossly intact.    Skin: Skin is warm and dry. No rash noted. She is not diaphoretic.   Psychiatric: She has a normal mood and affect. Her behavior is normal.   Nursing note and vitals reviewed.      ED Course     ED Course     Procedures    Medications   meclizine (ANTIVERT) tablet 25 mg (25 mg Oral Given 5/28/18 3010)     Meclizine improved dizziness.     Assessments & Plan (with Medical Decision Making)     Discussed plan of care. She verbalized understanding. All questions answered.     I have reviewed the nursing notes.    I have reviewed the findings, diagnosis, plan and need for follow up with the patient.  Discharged in stable condition.     New Prescriptions    MECLIZINE (ANTIVERT) 25 MG TABLET    Take 1 tablet (25 mg) by mouth 3 times daily as needed for dizziness       Final diagnoses:   Benign paroxysmal positional vertigo, unspecified laterality     Take Meclizine as needed as ordered.   Follow up with PCP this week.   Return to urgent care or emergency department with any increase in symptoms or concerns.     ISA Rogers  5/28/2018  4:20 PM  [facility]       Alyse Kraft NP  05/31/18 3566

## 2018-05-28 NOTE — ED NOTES
"C/o feeling dizzy since 5pm last night, pt states as long as she doesn't move she is not dizzy. Pt reports \"breathing in shower water\" on Saturday night and since then has a \"spot\" in her chest that hurts when she is coughing    "

## 2018-05-31 ASSESSMENT — ENCOUNTER SYMPTOMS
CHILLS: 0
DIZZINESS: 1
ABDOMINAL PAIN: 0
HEADACHES: 0
NUMBNESS: 0
FACIAL ASYMMETRY: 0
PSYCHIATRIC NEGATIVE: 1
DIARRHEA: 0
FEVER: 0
SINUS PRESSURE: 0
VOMITING: 0
NAUSEA: 0
TREMORS: 0
TROUBLE SWALLOWING: 0
SORE THROAT: 0
WEAKNESS: 0
APPETITE CHANGE: 1
RHINORRHEA: 0
COUGH: 0
ACTIVITY CHANGE: 0
DYSURIA: 0
SPEECH DIFFICULTY: 0

## 2018-06-01 ENCOUNTER — TELEPHONE (OUTPATIENT)
Dept: FAMILY MEDICINE | Facility: OTHER | Age: 43
End: 2018-06-01

## 2018-06-01 NOTE — TELEPHONE ENCOUNTER
3:53 PM    Reason for Call: OVERBOOK    Patient is having the following symptoms: 5  days.    The patient is requesting an appointment for dizziness with Dr. Levy.    Was an appointment offered for this call? No  If yes : Appointment type              Date    Preferred method for responding to this message: Telephone Call  What is your phone number ?[phone]    If we cannot reach you directly, may we leave a detailed response at the number you provided? Yes    Can this message wait until your PCP/provider returns, if unavailable today? Not applicable,     Suad Garnett

## 2018-06-03 ENCOUNTER — HOSPITAL ENCOUNTER (EMERGENCY)
Facility: HOSPITAL | Age: 43
Discharge: HOME | End: 2018-06-03
Attending: NURSE PRACTITIONER | Admitting: NURSE PRACTITIONER
Payer: COMMERCIAL

## 2018-06-03 VITALS
DIASTOLIC BLOOD PRESSURE: 73 MMHG | RESPIRATION RATE: 16 BRPM | TEMPERATURE: 97.3 F | SYSTOLIC BLOOD PRESSURE: 116 MMHG | OXYGEN SATURATION: 96 %

## 2018-06-03 DIAGNOSIS — J20.9 ACUTE BRONCHITIS, UNSPECIFIED ORGANISM: ICD-10-CM

## 2018-06-03 DIAGNOSIS — H81.10 BENIGN PAROXYSMAL POSITIONAL VERTIGO, UNSPECIFIED LATERALITY: ICD-10-CM

## 2018-06-03 PROCEDURE — G0463 HOSPITAL OUTPT CLINIC VISIT: HCPCS

## 2018-06-03 PROCEDURE — 99213 OFFICE O/P EST LOW 20 MIN: CPT | Performed by: NURSE PRACTITIONER

## 2018-06-03 RX ORDER — BENZONATATE 100 MG/1
100 CAPSULE ORAL 3 TIMES DAILY PRN
Qty: 30 CAPSULE | Refills: 0 | Status: SHIPPED | OUTPATIENT
Start: 2018-06-03 | End: 2018-10-05

## 2018-06-03 RX ORDER — MECLIZINE HYDROCHLORIDE 25 MG/1
25 TABLET ORAL 3 TIMES DAILY PRN
Qty: 15 TABLET | Refills: 0 | Status: SHIPPED | OUTPATIENT
Start: 2018-06-03 | End: 2018-10-10

## 2018-06-03 RX ORDER — AZITHROMYCIN 250 MG/1
TABLET, FILM COATED ORAL
Qty: 6 TABLET | Refills: 0 | Status: SHIPPED | OUTPATIENT
Start: 2018-06-03 | End: 2018-10-05

## 2018-06-03 ASSESSMENT — ENCOUNTER SYMPTOMS
COUGH: 1
SHORTNESS OF BREATH: 0
WHEEZING: 0
ACTIVITY CHANGE: 0
ABDOMINAL PAIN: 0
PSYCHIATRIC NEGATIVE: 1
RHINORRHEA: 0
FEVER: 0
TROUBLE SWALLOWING: 0
VOMITING: 0
NAUSEA: 0
SORE THROAT: 0
WEAKNESS: 0
DIARRHEA: 0
DYSURIA: 0
SINUS PRESSURE: 0
APPETITE CHANGE: 0
STRIDOR: 0
SINUS PAIN: 0

## 2018-06-03 NOTE — ED PROVIDER NOTES
History     Chief Complaint   Patient presents with     Cough     The history is provided by the patient. No  was used.     Vida Mederos is a 42 year old female who presents with a cough that started 8 days ago. She's taken OTC cough syrup with mild effectiveness. Denies fever. Positive for chills and night sweats. Eating and drinking well. Bowel and bladder are working well. No antibiotic use in the past 30 days.    She was seen in urgent care on 18 for benign positional paroxsymal vertigo. Meclizine was helpful, but she is almost out.       Problem List:    Patient Active Problem List    Diagnosis Date Noted     High triglycerides 2017     Priority: Medium     Pain in joint, multiple sites 2016     Priority: Medium     Encounter to establish care 2016     Priority: Medium     ACP (advance care planning) 2016     Priority: Medium     Advance Care Planning 6/3/2016: ACP Review of Chart / Resources Provided:  Reviewed chart for advance care plan.  Vida Mederos has no plan or code status on file. Discussed available resources and provided with information. Confirmed code status reflects current choices pending further ACP discussions.  Confirmed/documented legally designated decision makers.  Added by Kanika Biswas                Past Medical History:    Past Medical History:   Diagnosis Date     Gestational diabetes      Lumbar degenerative disc disease      Obesity      Threatened premature labor, antepartum(644.03) 2008     Twin pregnancy, antepartum condition or complication 3/31/2008       Past Surgical History:    Past Surgical History:   Procedure Laterality Date      SECTION  2008      SECTION       D & C  1999    3 months SAB       Family History:    Family History   Problem Relation Age of Onset     DIABETES Mother      Prostate Cancer Father      ? vs BPH     GERD Father      has lost teeth     DIABETES Maternal  Grandmother      DIABETES Paternal Grandmother      Other Cancer Sister      abnormal cells?     Other - See Comments Sister      hyst     Family History Negative Sister      Family History Negative Sister      Family History Negative Brother      Family History Negative Brother        Social History:  Marital Status:   [2]  Social History   Substance Use Topics     Smoking status: Never Smoker     Smokeless tobacco: Never Used     Alcohol use 0.0 oz/week     0 Standard drinks or equivalent per week      Comment: twice a month        Medications:      azithromycin (ZITHROMAX Z-EUGENE) 250 MG tablet   benzonatate (TESSALON) 100 MG capsule   meclizine (ANTIVERT) 25 MG tablet   IBUPROFEN PO         Review of Systems   Constitutional: Positive for chills. Negative for activity change, appetite change, fatigue and fever.   HENT: Positive for congestion. Negative for ear discharge, ear pain, postnasal drip, rhinorrhea, sinus pain, sinus pressure, sore throat and trouble swallowing.    Respiratory: Positive for cough. Negative for shortness of breath, wheezing and stridor.    Cardiovascular: Negative for chest pain.   Gastrointestinal: Negative for abdominal pain, diarrhea, nausea and vomiting.   Genitourinary: Negative for dysuria.   Skin: Negative for rash.   Neurological: Positive for dizziness. Negative for weakness.        Dizziness with movement.    Psychiatric/Behavioral: Negative.        Physical Exam   BP: 116/73  Heart Rate: 99  Temp: 97.3  F (36.3  C)  Resp: 16  SpO2: 96 %      Physical Exam   Constitutional: She is oriented to person, place, and time. She appears well-developed and well-nourished. No distress.   HENT:   Head: Normocephalic.   Right Ear: External ear normal.   Left Ear: External ear normal.   Mouth/Throat: Oropharynx is clear and moist.   Neck: Normal range of motion. Neck supple.   Cardiovascular: Normal rate, regular rhythm and normal heart sounds.    No murmur heard.  Pulmonary/Chest:  Effort normal. No respiratory distress. She has no wheezes. She has rales.   Abdominal: Soft. She exhibits no distension.   Musculoskeletal: Normal range of motion.   Lymphadenopathy:     She has no cervical adenopathy.   Neurological: She is alert and oriented to person, place, and time. She exhibits normal muscle tone. Coordination normal.   Skin: Skin is warm and dry. No rash noted. She is not diaphoretic.   Psychiatric: She has a normal mood and affect. Her behavior is normal.   Nursing note and vitals reviewed.      ED Course     ED Course     Procedures      Assessments & Plan (with Medical Decision Making)     Physical therapy order placed for benign positional paroxsymal vertigo. Discussed Epley maneuvers for BPPV. Ordered Meclizine.     Discussed plan of care. She verbalized understanding. All questions answered.     I have reviewed the nursing notes.    I have reviewed the findings, diagnosis, plan and need for follow up with the patient.  Discharged in stable condition.     Discharge Medication List as of 6/3/2018 10:55 AM      START taking these medications    Details   azithromycin (ZITHROMAX Z-EUGENE) 250 MG tablet Two tablets on the first day, then one tablet daily for the next 4 days, Disp-6 tablet, R-0, E-Prescribe      benzonatate (TESSALON) 100 MG capsule Take 1 capsule (100 mg) by mouth 3 times daily as needed for cough, Disp-30 capsule, R-0, E-Prescribe         Meclizine 25 MG 3 times a day as needed for dizziness.     Final diagnoses:   Acute bronchitis, unspecified organism   Benign paroxysmal positional vertigo, unspecified laterality     Take antibiotics as ordered.   Eat a yogurt daily while taking antibiotics.   Take tessalon as ordered as needed.  Take Meclizine as ordered as needed.   Increase fluid intake.   Physical therapy consult placed. They will be calling you.   Follow up with PCP with any increase in symptoms or concerns.   Return to urgent care or emergency department with any  increase in symptoms or concerns.     ISA Rogers  6/3/2018  10:09 AM  [facility]       Alyse Kraft NP  06/05/18 0906

## 2018-06-03 NOTE — ED AVS SNAPSHOT
[facility]    [address] [address]    Phone:  [phone]                                       Vida Mederos   MRN: [id number]    Department:  [facility]   Date of Visit:  6/3/2018           Patient Information     Date Of Birth          1975        Your diagnoses for this visit were:     Acute bronchitis, unspecified organism     Dizziness        You were seen by Alyse Kraft, NP.        Discharge Instructions       Take antibiotics as ordered.   Eat a yogurt daily while taking antibiotics.   Take tessalon as ordered.   Take Meclizine as ordered as needed.   Increase fluid intake.   Physical therapy consult placed. They will be calling you.   Follow up with PCP with any increase in symptoms or concerns.   Return to urgent care or emergency department with any increase in symptoms or concerns.     Discharge References/Attachments     BRONCHITIS, ANTIBIOTIC TREATMENT (ADULT) (ENGLISH)      ED Discharge Orders     PHYSICAL THERAPY REFERRAL       This therapy referral will be filtered to a centralized scheduling office at [facility] and the patient will receive a call to schedule an appointment at a [facility] location most convenient for them.      [facility] provides Physical Therapy evaluation and treatment and many specialty services across the [facility] system.  If requesting a specialty program, please choose from the list below.    If you have not heard from the scheduling office within 2 business days, please call [phone] for all locations, with the exception of [place], please call [phone] and [facility], please call [phone]  Treatment: Evaluation & Treatment  Special Instructions/Modalities:   Special Programs: PHYSICAL THERAPY SPECIAL PROGRAMS:[id number]    Please be aware that coverage of these services is subject to the terms and limitations of your health insurance plan.  Call member  "services at your health plan with any benefit or coverage questions.      **Note to Provider:  If you are referring outside of [facility] for the therapy appointment, please list the name of the location in the \"special instructions\" above, print the referral and give to the patient to schedule the appointment.                     Review of your medicines      START taking        Dose / Directions Last dose taken    azithromycin 250 MG tablet   Commonly known as:  ZITHROMAX Z-EUGENE   Quantity:  6 tablet        Two tablets on the first day, then one tablet daily for the next 4 days   Refills:  0        benzonatate 100 MG capsule   Commonly known as:  TESSALON   Dose:  100 mg   Quantity:  30 capsule        Take 1 capsule (100 mg) by mouth 3 times daily as needed for cough   Refills:  0          Our records show that you are taking the medicines listed below. If these are incorrect, please call your family doctor or clinic.        Dose / Directions Last dose taken    IBUPROFEN PO   Dose:  400 mg        Take 400 mg by mouth as needed for moderate pain   Refills:  0        meclizine 25 MG tablet   Commonly known as:  ANTIVERT   Dose:  25 mg   Quantity:  15 tablet        Take 1 tablet (25 mg) by mouth 3 times daily as needed for dizziness   Refills:  0                Prescriptions were sent or printed at these locations (3 Prescriptions)                   [id number] - [place], [place] - [address] [facility] [address]   [address] [facility] [address], [facility] [address]    Telephone:  [phone]   Fax:  [phone]   Hours:                  E-Prescribed (3 of 3)         azithromycin (ZITHROMAX Z-EUGENE) 250 MG tablet               benzonatate (TESSALON) 100 MG capsule               meclizine (ANTIVERT) 25 MG tablet                Orders Needing Specimen Collection     None      Pending Results     No orders found from 6/1/2018 to 6/4/2018.            Pending Culture Results     No orders found from 6/1/2018 to 6/4/2018.            Thank you for choosing " "[facility]       Thank you for choosing [facility] for your care. Our goal is always to provide you with excellent care. Hearing back from our patients is one way we can continue to improve our services. Please take a few minutes to complete the written survey that you may receive in the mail after you visit with us. Thank you!        [employer] Information     [employer] lets you send messages to your doctor, view your test results, renew your prescriptions, schedule appointments and more. To sign up, go to [email or web address] . Click on \"Log in\" on the left side of the screen, which will take you to the Welcome page. Then click on \"Sign up Now\" on the right side of the page.     You will be asked to enter the access code listed below, as well as some personal information. Please follow the directions to create your username and password.     Your access code is: [email or web address]  Expires: 2018  5:01 PM     Your access code will  in 90 days. If you need help or a new code, please call your [facility] clinic or [phone].        Care EveryWhere ID     This is your Care EveryWhere ID. This could be used by other organizations to access your [facility] medical records  [id number]        Equal Access to Services     TRISH VILLEGAS : Hadmaryann styleso Sojared, waaxda luqadaha, qaybta kaalmada adebritniyada, marcela seo. So [facility] [phone].    ATENCIÓN: Si habla español, tiene a rubio disposición servicios gratuitos de asistencia lingüística. Llame al [phone].    We comply with applicable federal civil rights laws and [place] laws. We do not discriminate on the basis of race, color, national origin, age, disability, sex, sexual orientation, or gender identity.            After Visit Summary       This is your record. Keep this with you and show to your community pharmacist(s) and doctor(s) at your next visit.                  "

## 2018-06-03 NOTE — ED AVS SNAPSHOT
[facility]    [address] [place] [address]    Phone:  [phone]                                       Vida Mederos   MRN: [id number]    Department:  [facility]   Date of Visit:  6/3/2018           After Visit Summary Signature Page     I have received my discharge instructions, and my questions have been answered. I have discussed any challenges I see with this plan with the nurse or doctor.    ..........................................................................................................................................  Patient/Patient Representative Signature      ..........................................................................................................................................  Patient Representative Print Name and Relationship to Patient    ..................................................               ................................................  Date                                            Time    ..........................................................................................................................................  Reviewed by Signature/Title    ...................................................              ..............................................  Date                                                            Time

## 2018-06-03 NOTE — DISCHARGE INSTRUCTIONS
Take antibiotics as ordered.   Eat a yogurt daily while taking antibiotics.   Take tessalon as ordered.   Take Meclizine as ordered as needed.   Increase fluid intake.   Physical therapy consult placed. They will be calling you.   Follow up with PCP with any increase in symptoms or concerns.   Return to urgent care or emergency department with any increase in symptoms or concerns.

## 2018-06-05 ASSESSMENT — ENCOUNTER SYMPTOMS
CHILLS: 1
FATIGUE: 0
DIZZINESS: 1

## 2018-06-18 ENCOUNTER — HOSPITAL ENCOUNTER (OUTPATIENT)
Dept: PHYSICAL THERAPY | Facility: HOSPITAL | Age: 43
Setting detail: THERAPIES SERIES
End: 2018-06-18
Attending: NURSE PRACTITIONER
Payer: COMMERCIAL

## 2018-06-18 DIAGNOSIS — H81.12 BENIGN PAROXYSMAL POSITIONAL VERTIGO, LEFT: Primary | ICD-10-CM

## 2018-06-18 PROCEDURE — 40000719 ZZHC STATISTIC PT DEPARTMENT NEURO VISIT: Performed by: PHYSICAL THERAPIST

## 2018-06-18 PROCEDURE — 97161 PT EVAL LOW COMPLEX 20 MIN: CPT | Mod: GP | Performed by: PHYSICAL THERAPIST

## 2018-06-18 PROCEDURE — 97530 THERAPEUTIC ACTIVITIES: CPT | Mod: GP | Performed by: PHYSICAL THERAPIST

## 2018-06-20 NOTE — PROGRESS NOTES
06/18/18 0833   Quick Adds   Quick Adds Vestibular Eval   Type of Visit Initial OP PT Evaluation   General Information   Start of Care Date 06/18/18   Referring Physician Alyse Kraft NP   Orders Evaluate and Treat as Indicated   Order Date 06/03/18   Medical Diagnosis BPPV, unspecified laterality   Onset of illness/injury or Date of Surgery 05/28/18   Surgical/Medical history reviewed Yes   Pertinent history of current vestibular problem (include personal factors and/or comorbidities that impact the POC)  Motion sickness;Prior concussion(s)   Pertinent history of current problem (include personal factors and/or comorbidities that impact the POC) Pt reports on the weekend of 5/28/18 she was busy with family gatherings, played frisbey golf, states it was hot and thought she had maybe gotten dehydrated.  Pt states she developed dizziness on 5/27/18.  Pt states if she was sitting and would turn her head to look at something she would get extreme dizziness.  Pt states when she woke up on 5/28/18 it was still present, reports anytime she moved she would get dizziness so came into ER.  Pt was given meclizine, states it did help her symptoms significantly although not completely.  Pt reports she was taking meclizine regularly for the next 2 weeks.  Pt did develope bronchitis and was seen in ED again with bronchitis.  Pt reports in the past week or so she hasn't used any meczline and states she has only had 1 episode of room spinning.  Pt does report she has fairly regular issues with more of a lightheaded feeling when going from squatting or taking a hot back and laying down to getting up.  Pt does report that she has had a h/o of dizzy episodes off and on in her past but does not necessarily describe them as room spinning.   Pertinent Visual History  wears glasses    Prior level of functional mobility Transfers;Ambulation;ADL   Transfers independent   Ambulation independent   ADL independent   Previous/Current  Treatment Medication(s)   Improvement after medication Significant   [facility]   Home/Community Accessibility Comments stairs within her home but does not have issues with them   Patient/Family Goals Statement stop having dizzy episodes   Fall Risk Screen   Fall screen completed by PT   Have you fallen 2 or more times in the past year? No   Have you fallen and had an injury in the past year? No   Is patient a fall risk? No   Pain   Patient currently in pain Denies   Pain comments Does report she has chronic aches/pains at time, but nothing new   Cognitive Status Examination   Orientation orientation to person, place and time   Level of Consciousness alert   Follows Commands and Answers Questions 100% of the time   Personal Safety and Judgment intact   Memory intact   Range of Motion (ROM)   ROM Comment Bilateral UE/LE AROM WFL   Strength   Strength Comments Bilateral UE/LE strength WFL throughout   Bed Mobility   Bed Mobility Comments independent   Transfer Skills   Transfer Comments independent   Gait   Gait Comments Ambulates without AD, normal gait   Balance   Balance Comments good without support   Cervicogenic Screen   Neck ROM WNL   Vertebral Artery Test Normal   Oculomotor Exam   Smooth Pursuit Normal   Saccades Normal   VOR Normal   VOR Cancellation Normal   Rapid Head Thrust Normal   Convergence Testing Normal   Infrared Goggle Exam or Frenzel Lense Exam   Vestibular Suppressant in Last 24 Hours? No   Exam completed with Frenzel Lenses   Spontaneous Nystagmus Negative   Gaze Evoked Nystagmus Negative   Head Shake Horizontal Nystagmus Negative   Chriss-Hallpike (right) Negative   Chriss-Hallpike (right) comments x3   Northwood-Hallpike (Left) Negative   Chriss-Hallpike (left) comments x3   HSCC Supine Roll Test (Right) Negative   HSCC Supine Roll Test (Right) Comments x3   HSCC Supine Roll Test (Left) Negative   HSCC Supine Roll Test (Left) Comments  x3   Planned Therapy Interventions   Planned  Therapy Interventions neuromuscular re-education;balance training   Clinical Impression   Criteria for Skilled Therapeutic Interventions Met yes, treatment indicated   PT Diagnosis intermittent dizziness   Influenced by the following impairments intermittent dizziness   Functional limitations due to impairments when dizziness presence has instability with gait   Clinical Presentation Stable/Uncomplicated   Clinical Presentation Rationale clinical judgement   Clinical Decision Making (Complexity) Low complexity   Therapy Frequency other (see comments)  (1-2x/week prn)   Predicted Duration of Therapy Intervention (days/wks) 8 weeks  (prn)   Risk & Benefits of therapy have been explained Yes   Patient, Family & other staff in agreement with plan of care Yes   Clinical Impression Comments Pt presents with intermittent episodes of dizziness with most recent one happening Memorial weekend but has since subsided.  Pt may be presenting with a Meneire's type variant as well as demonstrating some sort of orthostatic or BP issues as well.   Education Assessment   Preferred Learning Style Listening   Barriers to Learning No barriers   GOALS   PT Eval Goals 1;2   Goal 1   Goal Identifier STG 1   Goal Description Pt to be independent and compliant with HEP.   Target Date 07/02/18   Goal 2   Goal Identifier LTG 1   Goal Description Pt to be able to complete ADLs and functional activities without dizziness 90% of the time.   Target Date 08/13/18   Total Evaluation Time   Total Evaluation Time (Minutes) 28

## 2018-10-05 ENCOUNTER — HOSPITAL ENCOUNTER (EMERGENCY)
Facility: HOSPITAL | Age: 43
Discharge: HOME | End: 2018-10-06
Attending: FAMILY MEDICINE | Admitting: FAMILY MEDICINE
Payer: COMMERCIAL

## 2018-10-05 DIAGNOSIS — H81.11 BENIGN PAROXYSMAL POSITIONAL VERTIGO, RIGHT: ICD-10-CM

## 2018-10-05 PROCEDURE — 99285 EMERGENCY DEPT VISIT HI MDM: CPT | Mod: Z6 | Performed by: FAMILY MEDICINE

## 2018-10-05 PROCEDURE — 99285 EMERGENCY DEPT VISIT HI MDM: CPT | Mod: 25

## 2018-10-05 NOTE — ED AVS SNAPSHOT
[facility]    [address] [address]    Phone:  [phone]                                       Vida Mederos   MRN: [id number]    Department:  [facility]   Date of Visit:  10/5/2018           Patient Information     Date Of Birth          1975        Your diagnoses for this visit were:     Benign paroxysmal positional vertigo, right        You were seen by Razia Williamson MD.      Follow-up Information     Schedule an appointment as soon as possible for a visit with Razia Levy MD.    Specialty:  Family Practice    Why:  for follow up of ER     Contact information:    [facility]  [address]  [facility] [address]  [phone]          Discharge Instructions         Benign Paroxysmal Positional Vertigo     Your health care provider may move your head in certain ways to treat your BPPV.     Benign paroxysmal positional vertigo (BPPV) is a problem with the inner ear. The inner ear contains the vestibular system. This system is what helps you keep your balance. BPPV causes a feeling of spinning. It is a common problem of the vestibular system.  Understanding the vestibular system  The vestibular system of the ear is made up of very tiny parts. They include the utricle, saccule, and semicircular canals. The utricle is a tiny organ that contains calcium crystals. In some people, the crystals can move into the semicircular canals. When this happens, the system no longer works as it should. This causes BPPV. Benign means it is not life threatening. Paroxysmal means it happens suddenly. Positional means that it happens when you move your head. Vertigo is a feeling of spinning.  What causes BPPV?  Causes include injury to your head or neck. Other problems with the vestibular system may cause BPPV. In many people, the cause of BPPV is not known.  Symptoms of BPPV  You many have repeated feelings of spinning (vertigo). The vertigo  usually lasts less than 1 minute. Some movements, such as rolling over in bed, can bring on vertigo.  Diagnosing BPPV  Your primary healthcare provider may diagnose and treat your BPPV. Or you may see an ear, nose, and throat doctor (otolaryngologist). In some cases, you may see a nervous system doctor (neurologist).  The healthcare provider will ask about your symptoms and your medical history. He or she will examine you. You may have hearing and balance tests. As part of the exam, your healthcare provider may have you move your head and body in certain ways. If you have BPPV, the movements can bring on vertigo. Your provider will also look for abnormal movements of your eyes. You may have other tests to check your vestibular or nervous systems.  Treatment for BPPV  Your healthcare provider may try to move the calcium crystals. This is done by having you move your head and neck in certain ways. This treatment is safe and often works well. You may also be told to do these movements at home. You may still have vertigo for a few weeks. Your healthcare provider will recheck your symptoms, usually in about a month. Special physical therapy may also be part of treatment. In rare cases, surgery may be needed for BPPV that does not go away.     When to call the healthcare provider  Call your healthcare provider right away if you have any of these:    Symptoms that do not go away with treatment    Symptoms that get worse    New symptoms   Date Last Reviewed: 5/1/2017 2000-2017 [employer] [address] [place] [address]. All rights reserved. This information is not intended as a substitute for professional medical care. Always follow your healthcare professional's instructions.          ED Discharge Orders     MRI Brain w & w/o contrast                    Review of your medicines      START taking        Dose / Directions Last dose taken    fluticasone 50 MCG/ACT spray   Commonly known as:   FLONASE   Dose:  1-2 spray   Quantity:  1 Bottle        Spray 1-2 sprays into both nostrils daily   Refills:  11        ondansetron 4 MG ODT tab   Commonly known as:  ZOFRAN ODT   Dose:  4 mg   Quantity:  10 tablet        Take 1 tablet (4 mg) by mouth every 8 hours as needed for nausea   Refills:  0          CONTINUE these medicines which may have CHANGED, or have new prescriptions. If we are uncertain of the size of tablets/capsules you have at home, strength may be listed as something that might have changed.        Dose / Directions Last dose taken    * meclizine 25 MG tablet   Commonly known as:  ANTIVERT   Dose:  25 mg   What changed:  Another medication with the same name was added. Make sure you understand how and when to take each.   Quantity:  15 tablet        Take 1 tablet (25 mg) by mouth 3 times daily as needed for dizziness   Refills:  0        * meclizine 12.5 MG tablet   Commonly known as:  ANTIVERT   Dose:  25 mg   What changed:  You were already taking a medication with the same name, and this prescription was added. Make sure you understand how and when to take each.   Quantity:  30 tablet        Take 2 tablets (25 mg) by mouth 3 times daily as needed for dizziness   Refills:  0        * Notice:  This list has 2 medication(s) that are the same as other medications prescribed for you. Read the directions carefully, and ask your doctor or other care provider to review them with you.      Our records show that you are taking the medicines listed below. If these are incorrect, please call your family doctor or clinic.        Dose / Directions Last dose taken    BENADRYL PO   Dose:  25 mg        Take 25 mg by mouth   Refills:  0        IBUPROFEN PO   Dose:  400 mg        Take 400 mg by mouth as needed for moderate pain   Refills:  0                Prescriptions were sent or printed at these locations (3 Prescriptions)                   [facility] [address] [place] [address], [place] - [address]    [address] ,  [place] [address]    Telephone:  [phone]   Fax:  [phone]   Hours:                  E-Prescribed (3 of 3)         fluticasone (FLONASE) 50 MCG/ACT spray               meclizine (ANTIVERT) 12.5 MG tablet               ondansetron (ZOFRAN ODT) 4 MG ODT tab                Procedures and tests performed during your visit     Basic metabolic panel    CBC with platelets differential    CRP inflammation    CT Head w/o Contrast    CT Sinus w/o Contrast    EKG 12 lead    Erythrocyte sedimentation rate auto    Lyme Disease Jammie with reflex to WB Serum    Magnesium    Peripheral IV catheter    TSH with free T4 reflex      Orders Needing Specimen Collection     Ordered          10/06/18 0024  UA with Microscopic reflex to Culture - STAT, [facility]: STAT, Status: Sent     Scheduled Task Status   10/06/18 0025 [facility] CC Reminder: Open   Order Class:  PCU Collect                10/06/18 0035  HCG qualitative urine - STAT, Prio: STAT, Status: Sent     Scheduled Task Status   10/06/18 0036 [facility] CC Reminder: Open   Order Class:  PCU Collect                  Pending Results     Date and Time Order Name Status Description    10/6/2018 0023 CT Sinus w/o Contrast In process     10/6/2018 0023 CT Head w/o Contrast In process     10/6/2018 0021 Lyme Disease Jammie with reflex to WB Serum In process             Pending Culture Results     No orders found for last 3 day(s).            Thank you for choosing [facility]       Thank you for choosing [facility] for your care. Our goal is always to provide you with excellent care. Hearing back from our patients is one way we can continue to improve our services. Please take a few minutes to complete the written survey that you may receive in the mail after you visit with us. Thank you!        [employer] Information     [employer] lets you send messages to your doctor, view your test results, renew your prescriptions, schedule appointments and more. To sign up, go to [email or web address] . Click on  "\"Log in\" on the left side of the screen, which will take you to the Welcome page. Then click on \"Sign up Now\" on the right side of the page.     You will be asked to enter the access code listed below, as well as some personal information. Please follow the directions to create your username and password.     Your access code is: [email or web address]  Expires: 2019  3:27 AM     Your access code will  in 90 days. If you need help or a new code, please call your [facility] clinic or [phone].        Care EveryWhere ID     This is your Care EveryWhere ID. This could be used by other organizations to access your [facility] medical records  [id number]        Equal Access to Services     TRISH VILLEGAS : Devante Cornelius, wachrystal flood, qafabiola kaalmarohan najera, marcela seo. So [facility] [phone].    ATENCIÓN: Si habla español, tiene a rubio disposición servicios gratuitos de asistencia lingüística. Llame al [phone].    We comply with applicable federal civil rights laws and [place] laws. We do not discriminate on the basis of race, color, national origin, age, disability, sex, sexual orientation, or gender identity.            After Visit Summary       This is your record. Keep this with you and show to your community pharmacist(s) and doctor(s) at your next visit.                  "

## 2018-10-05 NOTE — ED AVS SNAPSHOT
[facility]    [address] [place] [address]    Phone:  [phone]                                       Vida Mederos   MRN: [id number]    Department:  [facility]   Date of Visit:  10/5/2018           After Visit Summary Signature Page     I have received my discharge instructions, and my questions have been answered. I have discussed any challenges I see with this plan with the nurse or doctor.    ..........................................................................................................................................  Patient/Patient Representative Signature      ..........................................................................................................................................  Patient Representative Print Name and Relationship to Patient    ..................................................               ................................................  Date                                   Time    ..........................................................................................................................................  Reviewed by Signature/Title    ...................................................              ..............................................  Date                                               Time          22EPIC Rev 08/18

## 2018-10-06 ENCOUNTER — APPOINTMENT (OUTPATIENT)
Dept: CT IMAGING | Facility: HOSPITAL | Age: 43
End: 2018-10-06
Attending: FAMILY MEDICINE
Payer: COMMERCIAL

## 2018-10-06 VITALS
DIASTOLIC BLOOD PRESSURE: 71 MMHG | WEIGHT: 168 LBS | BODY MASS INDEX: 28.84 KG/M2 | OXYGEN SATURATION: 97 % | RESPIRATION RATE: 18 BRPM | TEMPERATURE: 97.8 F | SYSTOLIC BLOOD PRESSURE: 110 MMHG

## 2018-10-06 LAB
ANION GAP SERPL CALCULATED.3IONS-SCNC: 8 MMOL/L (ref 3–14)
BASOPHILS # BLD AUTO: 0.1 10E9/L (ref 0–0.2)
BASOPHILS NFR BLD AUTO: 1 %
BUN SERPL-MCNC: 17 MG/DL (ref 7–30)
CALCIUM SERPL-MCNC: 8.9 MG/DL (ref 8.5–10.1)
CHLORIDE SERPL-SCNC: 106 MMOL/L (ref 94–109)
CO2 SERPL-SCNC: 24 MMOL/L (ref 20–32)
CREAT SERPL-MCNC: 0.74 MG/DL (ref 0.52–1.04)
CRP SERPL-MCNC: <2.9 MG/L (ref 0–8)
DIFFERENTIAL METHOD BLD: NORMAL
EOSINOPHIL # BLD AUTO: 0.2 10E9/L (ref 0–0.7)
EOSINOPHIL NFR BLD AUTO: 1.9 %
ERYTHROCYTE [DISTWIDTH] IN BLOOD BY AUTOMATED COUNT: 11.8 % (ref 10–15)
ERYTHROCYTE [SEDIMENTATION RATE] IN BLOOD BY WESTERGREN METHOD: 8 MM/H (ref 0–20)
GFR SERPL CREATININE-BSD FRML MDRD: 84 ML/MIN/1.7M2
GLUCOSE SERPL-MCNC: 157 MG/DL (ref 70–99)
HCT VFR BLD AUTO: 41 % (ref 35–47)
HGB BLD-MCNC: 14.3 G/DL (ref 11.7–15.7)
IMM GRANULOCYTES # BLD: 0 10E9/L (ref 0–0.4)
IMM GRANULOCYTES NFR BLD: 0.2 %
LYMPHOCYTES # BLD AUTO: 3 10E9/L (ref 0.8–5.3)
LYMPHOCYTES NFR BLD AUTO: 36.1 %
MAGNESIUM SERPL-MCNC: 2.1 MG/DL (ref 1.6–2.3)
MCH RBC QN AUTO: 30.2 PG (ref 26.5–33)
MCHC RBC AUTO-ENTMCNC: 34.9 G/DL (ref 31.5–36.5)
MCV RBC AUTO: 87 FL (ref 78–100)
MONOCYTES # BLD AUTO: 0.5 10E9/L (ref 0–1.3)
MONOCYTES NFR BLD AUTO: 6.4 %
NEUTROPHILS # BLD AUTO: 4.6 10E9/L (ref 1.6–8.3)
NEUTROPHILS NFR BLD AUTO: 54.4 %
NRBC # BLD AUTO: 0 10*3/UL
NRBC BLD AUTO-RTO: 0 /100
PLATELET # BLD AUTO: 343 10E9/L (ref 150–450)
POTASSIUM SERPL-SCNC: 3.6 MMOL/L (ref 3.4–5.3)
RBC # BLD AUTO: 4.73 10E12/L (ref 3.8–5.2)
SODIUM SERPL-SCNC: 138 MMOL/L (ref 133–144)
TSH SERPL DL<=0.005 MIU/L-ACNC: 3.98 MU/L (ref 0.4–4)
WBC # BLD AUTO: 8.4 10E9/L (ref 4–11)

## 2018-10-06 PROCEDURE — 96375 TX/PRO/DX INJ NEW DRUG ADDON: CPT

## 2018-10-06 PROCEDURE — 85025 COMPLETE CBC W/AUTO DIFF WBC: CPT | Performed by: FAMILY MEDICINE

## 2018-10-06 PROCEDURE — 83735 ASSAY OF MAGNESIUM: CPT | Performed by: FAMILY MEDICINE

## 2018-10-06 PROCEDURE — 70486 CT MAXILLOFACIAL W/O DYE: CPT | Mod: TC

## 2018-10-06 PROCEDURE — 86618 LYME DISEASE ANTIBODY: CPT | Performed by: FAMILY MEDICINE

## 2018-10-06 PROCEDURE — 93010 ELECTROCARDIOGRAM REPORT: CPT | Performed by: INTERNAL MEDICINE

## 2018-10-06 PROCEDURE — 96361 HYDRATE IV INFUSION ADD-ON: CPT

## 2018-10-06 PROCEDURE — 93005 ELECTROCARDIOGRAM TRACING: CPT

## 2018-10-06 PROCEDURE — 85652 RBC SED RATE AUTOMATED: CPT | Performed by: FAMILY MEDICINE

## 2018-10-06 PROCEDURE — 25000128 H RX IP 250 OP 636: Performed by: FAMILY MEDICINE

## 2018-10-06 PROCEDURE — 70450 CT HEAD/BRAIN W/O DYE: CPT | Mod: TC

## 2018-10-06 PROCEDURE — 80048 BASIC METABOLIC PNL TOTAL CA: CPT | Performed by: FAMILY MEDICINE

## 2018-10-06 PROCEDURE — 84443 ASSAY THYROID STIM HORMONE: CPT | Performed by: FAMILY MEDICINE

## 2018-10-06 PROCEDURE — 36415 COLL VENOUS BLD VENIPUNCTURE: CPT | Performed by: FAMILY MEDICINE

## 2018-10-06 PROCEDURE — 96374 THER/PROPH/DIAG INJ IV PUSH: CPT

## 2018-10-06 PROCEDURE — 86140 C-REACTIVE PROTEIN: CPT | Performed by: FAMILY MEDICINE

## 2018-10-06 RX ORDER — MECLIZINE HCL 12.5 MG 12.5 MG/1
25 TABLET ORAL 3 TIMES DAILY PRN
Qty: 30 TABLET | Refills: 0 | Status: SHIPPED | OUTPATIENT
Start: 2018-10-06 | End: 2018-10-10

## 2018-10-06 RX ORDER — FLUTICASONE PROPIONATE 50 MCG
1-2 SPRAY, SUSPENSION (ML) NASAL DAILY
Qty: 1 BOTTLE | Refills: 11 | Status: SHIPPED | OUTPATIENT
Start: 2018-10-06

## 2018-10-06 RX ORDER — ONDANSETRON 4 MG/1
4 TABLET, ORALLY DISINTEGRATING ORAL EVERY 8 HOURS PRN
Qty: 10 TABLET | Refills: 0 | Status: SHIPPED | OUTPATIENT
Start: 2018-10-06 | End: 2018-10-09

## 2018-10-06 RX ORDER — LORAZEPAM 2 MG/ML
1 INJECTION INTRAMUSCULAR ONCE
Status: COMPLETED | OUTPATIENT
Start: 2018-10-06 | End: 2018-10-06

## 2018-10-06 RX ORDER — ONDANSETRON 2 MG/ML
4 INJECTION INTRAMUSCULAR; INTRAVENOUS ONCE
Status: COMPLETED | OUTPATIENT
Start: 2018-10-06 | End: 2018-10-06

## 2018-10-06 RX ADMIN — ONDANSETRON 4 MG: 2 INJECTION, SOLUTION INTRAMUSCULAR; INTRAVENOUS at 00:23

## 2018-10-06 RX ADMIN — LORAZEPAM 1 MG: 2 INJECTION INTRAMUSCULAR; INTRAVENOUS at 00:50

## 2018-10-06 RX ADMIN — SODIUM CHLORIDE 1000 ML: 9 INJECTION, SOLUTION INTRAVENOUS at 00:23

## 2018-10-06 ASSESSMENT — ENCOUNTER SYMPTOMS
SINUS PRESSURE: 0
RESPIRATORY NEGATIVE: 1
VOMITING: 1
SORE THROAT: 0
CARDIOVASCULAR NEGATIVE: 1
PSYCHIATRIC NEGATIVE: 1
MUSCULOSKELETAL NEGATIVE: 1
DIZZINESS: 1
LIGHT-HEADEDNESS: 1
TROUBLE SWALLOWING: 0
VOICE CHANGE: 0
NAUSEA: 1
SINUS PAIN: 0

## 2018-10-06 NOTE — ED NOTES
Patient discharged home at this time. Patient given written and verbal discharge instructions regarding home care, f/u and medications. Patient verbalized understanding of all discharge instructions. Aware RX available at [facility]

## 2018-10-06 NOTE — ED PROVIDER NOTES
History     Chief Complaint   Patient presents with     Dizziness     since waking up this morning, worse when laying down or moving head to R side     Nausea     states when she is dizzy, she experiences nausea and dry heaving     HPI  Vida Mederos is a 43 year old female who presents with symptoms of vertigo . Symptoms today started at 0630. Patient woke up with room spinning . Had to force herself out of bed . Had to force herself to get to bathroom to vomit . Has had dry heaves 2 other times. Nausea has calmed down . Had 2 very severe episodes , first episode at 0630 this am and 1130 at night . Episodes both started while just lying in bed . Tried taking meclizine three times today no relief . ONly thing that makes a differenc is not moving. NO headache. Is having some blurred vision left eye more than right .  Has not had issues with sinuses but was concerned because friend had said she has had similar symptoms and has sinus symptoms . Has no sinus symptoms. Did feel sensation of post nasal drainage NO seasonal allergies NO ringing in ears. NO head injuries   Problem List:    Patient Active Problem List    Diagnosis Date Noted     High triglycerides 01/19/2017     Priority: Medium     Pain in joint, multiple sites 12/08/2016     Priority: Medium     Encounter to establish care 12/08/2016     Priority: Medium     ACP (advance care planning) 06/03/2016     Priority: Medium     Advance Care Planning 6/3/2016: ACP Review of Chart / Resources Provided:  Reviewed chart for advance care plan.  Vida Mederos has no plan or code status on file. Discussed available resources and provided with information. Confirmed code status reflects current choices pending further ACP discussions.  Confirmed/documented legally designated decision makers.  Added by Kanika Biswas                Past Medical History:    Past Medical History:   Diagnosis Date     Gestational diabetes      Lumbar degenerative disc disease 2011      Obesity      Threatened premature labor, antepartum(644.03) 2008     Twin pregnancy, antepartum condition or complication 3/31/2008       Past Surgical History:    Past Surgical History:   Procedure Laterality Date      SECTION  2008      SECTION  2011     D & C  1999    3 months SAB       Family History:    Family History   Problem Relation Age of Onset     Diabetes Mother      Prostate Cancer Father      ? vs BPH     GERD Father      has lost teeth     Diabetes Maternal Grandmother      Diabetes Paternal Grandmother      Other Cancer Sister      abnormal cells?     Other - See Comments Sister      hyst     Family History Negative Sister      Family History Negative Sister      Family History Negative Brother      Family History Negative Brother        Social History:  Marital Status:   [2]  Social History   Substance Use Topics     Smoking status: Never Smoker     Smokeless tobacco: Never Used     Alcohol use 0.0 oz/week     0 Standard drinks or equivalent per week      Comment: twice a month        Medications:      DiphenhydrAMINE HCl (BENADRYL PO)   meclizine (ANTIVERT) 25 MG tablet   IBUPROFEN PO         Review of Systems   HENT: Positive for congestion and postnasal drip. Negative for sinus pain, sinus pressure, sneezing, sore throat, tinnitus, trouble swallowing and voice change.    Respiratory: Negative.    Cardiovascular: Negative.    Gastrointestinal: Positive for nausea and vomiting.   Genitourinary: Negative.    Musculoskeletal: Negative.    Neurological: Positive for dizziness and light-headedness.   Psychiatric/Behavioral: Negative.    All other systems reviewed and are negative.      Physical Exam   BP: 115/84  Heart Rate: 67  Temp: 98  F (36.7  C)  Resp: 16  Weight: 76.2 kg (168 lb)  SpO2: 97 %      Physical Exam   Constitutional: She is oriented to person, place, and time. She appears well-developed and well-nourished. No distress.   HENT:   Head: Normocephalic  and atraumatic.   Left Ear: External ear normal.   Mouth/Throat: No oropharyngeal exudate.   Right TM with significant serous effusion  Posterior pharynx with erythema    Eyes: Pupils are equal, round, and reactive to light. Right eye exhibits no discharge. Left eye exhibits no discharge.   Horizontal nystagmus    Neck:   ROM not tested secondary to precipitating dizziness    Cardiovascular: Normal rate, regular rhythm and normal heart sounds.    No murmur heard.  Pulmonary/Chest: Effort normal and breath sounds normal.   Abdominal: Soft.   Musculoskeletal: Normal range of motion.   Lymphadenopathy:     She has no cervical adenopathy.   Neurological: She is alert and oriented to person, place, and time. She has normal reflexes. She displays normal reflexes.   Skin: Skin is warm. She is not diaphoretic.   Psychiatric: She has a normal mood and affect.   Nursing note and vitals reviewed.      ED Course     ED Course     Procedures    Patient presents to ER with complaint of dizziness for 1 day Patient triaged to exam room . Vital signs reviewed. Medical records reviewed. History and exam completed. Labs and diagnostics ordered. IV inserted. Iv fluid bolus, Zofran 4 mg given. Patient with improvement in nausea but persistent dizziness not significantly improved. Exam suggestive of peripheral vertigo not central  . IV lorazepam given . Patient without significant improvement in symptoms following ativan . Labs and diagnostics returned and reviewed. NO abnormalities. Discussed with patient most likely diagnosis or benign positional vertigo. Given this is patients third episode she is encouraged to establish care with a primary care physician and schedule follow up . MRI is ordered prior to discharge to evaluate for acoustic neuroma as patient has already been to PT and PT maneuvers have failed . Patient given refill of her prescription for meclizine. New prescription for zofran . Written and verbal patient education  provided. Return to ER with any concerns .   Results for orders placed or performed during the hospital encounter of 10/05/18   CT Head w/o Contrast    Narrative    PROCEDURE: CT HEAD W/O CONTRAST 10/6/2018 1:40 AM    HISTORY: dizzy;     COMPARISONS: None.    Meds/Dose Given: None.    TECHNIQUE: Axial noncontrast enhanced images with coronal and sagittal  reformatted images.    FINDINGS: The ventricles, sulci and basilar cisterns are normal in  size for no mass or midline shift is seen. There is no extra-axial  fluid collection or focal hemorrhage.    Bone windows show no fracture. Visualized paranasal sinuses and  mastoid air cells are clear.         Impression    IMPRESSION: No intracranial mass effect or hemorrhage. No skull  fracture.    Findings are concordant with the original virtual radiology result.    DEON FRANCISCO MD   CT Sinus w/o Contrast    Narrative    PROCEDURE: CT SINUS W/O CONTRAST 10/6/2018 1:41 AM    HISTORY: vertigo;     COMPARISONS: None.    Meds/Dose Given: None.    TECHNIQUE: Axial noncontrast enhanced images with coronal and sagittal  reformatted images.    FINDINGS: There is mucoperiosteal thickening in inferior maxillary  sinuses. No air-fluid level is seen. Paranasal sinuses are otherwise  clear. No air-fluid level is seen and there is no bone destruction.    There is slight deviation of the nasal septum to the right.         Impression    IMPRESSION: Mild mucoperiosteal thickening in inferior maxillary  sinuses.    Findings are concordant with the original virtual radiology result.    DEON FRANCICSO MD   CBC with platelets differential   Result Value Ref Range    WBC 8.4 4.0 - 11.0 10e9/L    RBC Count 4.73 3.8 - 5.2 10e12/L    Hemoglobin 14.3 11.7 - 15.7 g/dL    Hematocrit 41.0 35.0 - 47.0 %    MCV 87 78 - 100 fl    MCH 30.2 26.5 - 33.0 pg    MCHC 34.9 31.5 - 36.5 g/dL    RDW 11.8 10.0 - 15.0 %    Platelet Count 343 150 - 450 10e9/L    Diff Method Automated Method     %  Neutrophils 54.4 %    % Lymphocytes 36.1 %    % Monocytes 6.4 %    % Eosinophils 1.9 %    % Basophils 1.0 %    % Immature Granulocytes 0.2 %    Nucleated RBCs 0 0 /100    Absolute Neutrophil 4.6 1.6 - 8.3 10e9/L    Absolute Lymphocytes 3.0 0.8 - 5.3 10e9/L    Absolute Monocytes 0.5 0.0 - 1.3 10e9/L    Absolute Eosinophils 0.2 0.0 - 0.7 10e9/L    Absolute Basophils 0.1 0.0 - 0.2 10e9/L    Abs Immature Granulocytes 0.0 0 - 0.4 10e9/L    Absolute Nucleated RBC 0.0    TSH with free T4 reflex   Result Value Ref Range    TSH 3.98 0.40 - 4.00 mU/L   Basic metabolic panel   Result Value Ref Range    Sodium 138 133 - 144 mmol/L    Potassium 3.6 3.4 - 5.3 mmol/L    Chloride 106 94 - 109 mmol/L    Carbon Dioxide 24 20 - 32 mmol/L    Anion Gap 8 3 - 14 mmol/L    Glucose 157 (H) 70 - 99 mg/dL    Urea Nitrogen 17 7 - 30 mg/dL    Creatinine 0.74 0.52 - 1.04 mg/dL    GFR Estimate 84 >60 mL/min/1.7m2    GFR Estimate If Black >90 >60 mL/min/1.7m2    Calcium 8.9 8.5 - 10.1 mg/dL   Magnesium   Result Value Ref Range    Magnesium 2.1 1.6 - 2.3 mg/dL   CRP inflammation   Result Value Ref Range    CRP Inflammation <2.9 0.0 - 8.0 mg/L   Erythrocyte sedimentation rate auto   Result Value Ref Range    Sed Rate 8 0 - 20 mm/h             No results found for this or any previous visit (from the past 24 hour(s)).    Medications   0.9% sodium chloride BOLUS (1,000 mLs Intravenous New Bag 10/6/18 0023)   ondansetron (ZOFRAN) injection 4 mg (4 mg Intravenous Given 10/6/18 0023)       Assessments & Plan (with Medical Decision Making)     I have reviewed the nursing notes.    I have reviewed the findings, diagnosis, plan and need for follow up with the patient.      New Prescriptions    No medications on file       Final diagnoses:   Benign paroxysmal positional vertigo, right       10/5/2018   [facility]     Razia Williamson MD  10/06/18 [id number]

## 2018-10-06 NOTE — DISCHARGE INSTRUCTIONS
Benign Paroxysmal Positional Vertigo     Your health care provider may move your head in certain ways to treat your BPPV.     Benign paroxysmal positional vertigo (BPPV) is a problem with the inner ear. The inner ear contains the vestibular system. This system is what helps you keep your balance. BPPV causes a feeling of spinning. It is a common problem of the vestibular system.  Understanding the vestibular system  The vestibular system of the ear is made up of very tiny parts. They include the utricle, saccule, and semicircular canals. The utricle is a tiny organ that contains calcium crystals. In some people, the crystals can move into the semicircular canals. When this happens, the system no longer works as it should. This causes BPPV. Benign means it is not life threatening. Paroxysmal means it happens suddenly. Positional means that it happens when you move your head. Vertigo is a feeling of spinning.  What causes BPPV?  Causes include injury to your head or neck. Other problems with the vestibular system may cause BPPV. In many people, the cause of BPPV is not known.  Symptoms of BPPV  You many have repeated feelings of spinning (vertigo). The vertigo usually lasts less than 1 minute. Some movements, such as rolling over in bed, can bring on vertigo.  Diagnosing BPPV  Your primary healthcare provider may diagnose and treat your BPPV. Or you may see an ear, nose, and throat doctor (otolaryngologist). In some cases, you may see a nervous system doctor (neurologist).  The healthcare provider will ask about your symptoms and your medical history. He or she will examine you. You may have hearing and balance tests. As part of the exam, your healthcare provider may have you move your head and body in certain ways. If you have BPPV, the movements can bring on vertigo. Your provider will also look for abnormal movements of your eyes. You may have other tests to check your vestibular or nervous systems.  Treatment  for BPPV  Your healthcare provider may try to move the calcium crystals. This is done by having you move your head and neck in certain ways. This treatment is safe and often works well. You may also be told to do these movements at home. You may still have vertigo for a few weeks. Your healthcare provider will recheck your symptoms, usually in about a month. Special physical therapy may also be part of treatment. In rare cases, surgery may be needed for BPPV that does not go away.     When to call the healthcare provider  Call your healthcare provider right away if you have any of these:    Symptoms that do not go away with treatment    Symptoms that get worse    New symptoms   Date Last Reviewed: 5/1/2017 2000-2017 [employer]. [address] [place] [address], [place] [address]. All rights reserved. This information is not intended as a substitute for professional medical care. Always follow your healthcare professional's instructions.

## 2018-10-06 NOTE — ED NOTES
"44 y/o female presents with dizziness and nausea. States she has had 2 episodes of extreme dizziness since this morning states \"it felt like the whole room was spinning.\" Reports nausea and dry heaving at that time. Denies nausea at this time. States dizziness is worse when lying down and when she turns her head to the right. Took Meclizine and benadryl at 2300 with no relief. Also concerned about sinus issues.  "

## 2018-10-09 ENCOUNTER — HOSPITAL ENCOUNTER (OUTPATIENT)
Dept: MRI IMAGING | Facility: HOSPITAL | Age: 43
Discharge: HOME | End: 2018-10-09
Attending: FAMILY MEDICINE | Admitting: FAMILY MEDICINE
Payer: COMMERCIAL

## 2018-10-09 DIAGNOSIS — H81.11 BENIGN PAROXYSMAL POSITIONAL VERTIGO, RIGHT: ICD-10-CM

## 2018-10-09 LAB — B BURGDOR IGG+IGM SER QL: 0.07 (ref 0–0.89)

## 2018-10-09 PROCEDURE — 25500064 ZZH RX 255 OP 636: Performed by: RADIOLOGY

## 2018-10-09 PROCEDURE — A9585 GADOBUTROL INJECTION: HCPCS | Performed by: RADIOLOGY

## 2018-10-09 PROCEDURE — 70553 MRI BRAIN STEM W/O & W/DYE: CPT | Mod: TC

## 2018-10-09 RX ORDER — GADOBUTROL 604.72 MG/ML
7.5 INJECTION INTRAVENOUS ONCE
Status: COMPLETED | OUTPATIENT
Start: 2018-10-09 | End: 2018-10-09

## 2018-10-09 RX ADMIN — GADOBUTROL 7.5 ML: 604.72 INJECTION INTRAVENOUS at 08:08

## 2018-10-10 ENCOUNTER — OFFICE VISIT (OUTPATIENT)
Dept: FAMILY MEDICINE | Facility: OTHER | Age: 43
End: 2018-10-10
Attending: FAMILY MEDICINE
Payer: COMMERCIAL

## 2018-10-10 ENCOUNTER — OFFICE VISIT (OUTPATIENT)
Dept: OTOLARYNGOLOGY | Facility: OTHER | Age: 43
End: 2018-10-10
Attending: PHYSICIAN ASSISTANT
Payer: COMMERCIAL

## 2018-10-10 VITALS
OXYGEN SATURATION: 98 % | BODY MASS INDEX: 30.55 KG/M2 | DIASTOLIC BLOOD PRESSURE: 64 MMHG | TEMPERATURE: 97.8 F | HEART RATE: 66 BPM | SYSTOLIC BLOOD PRESSURE: 108 MMHG | WEIGHT: 178 LBS

## 2018-10-10 VITALS
HEART RATE: 73 BPM | WEIGHT: 178 LBS | BODY MASS INDEX: 29.66 KG/M2 | TEMPERATURE: 97.4 F | SYSTOLIC BLOOD PRESSURE: 118 MMHG | DIASTOLIC BLOOD PRESSURE: 70 MMHG | HEIGHT: 65 IN

## 2018-10-10 DIAGNOSIS — R42 VERTIGO: ICD-10-CM

## 2018-10-10 DIAGNOSIS — H81.11 BPPV (BENIGN PAROXYSMAL POSITIONAL VERTIGO), RIGHT: ICD-10-CM

## 2018-10-10 DIAGNOSIS — H61.21 CERUMINOSIS, RIGHT: Primary | ICD-10-CM

## 2018-10-10 DIAGNOSIS — H73.891 CRUSTED TYMPANIC MEMBRANE OF RIGHT EAR: ICD-10-CM

## 2018-10-10 DIAGNOSIS — H61.21 CERUMINOSIS, RIGHT: ICD-10-CM

## 2018-10-10 DIAGNOSIS — R42 DIZZINESS: Primary | ICD-10-CM

## 2018-10-10 DIAGNOSIS — H61.891 IRRITATION OF EXTERNAL EAR CANAL, RIGHT: ICD-10-CM

## 2018-10-10 PROCEDURE — 99213 OFFICE O/P EST LOW 20 MIN: CPT | Performed by: FAMILY MEDICINE

## 2018-10-10 PROCEDURE — G0463 HOSPITAL OUTPT CLINIC VISIT: HCPCS

## 2018-10-10 PROCEDURE — 99214 OFFICE O/P EST MOD 30 MIN: CPT | Mod: 25 | Performed by: PHYSICIAN ASSISTANT

## 2018-10-10 PROCEDURE — 92504 EAR MICROSCOPY EXAMINATION: CPT | Performed by: PHYSICIAN ASSISTANT

## 2018-10-10 RX ORDER — DIAZEPAM 2 MG
2 TABLET ORAL EVERY 8 HOURS PRN
Qty: 20 TABLET | Refills: 0 | Status: SHIPPED | OUTPATIENT
Start: 2018-10-10

## 2018-10-10 RX ORDER — OFLOXACIN 3 MG/ML
10 SOLUTION AURICULAR (OTIC) 2 TIMES DAILY
Qty: 10 ML | Refills: 0 | Status: SHIPPED | OUTPATIENT
Start: 2018-10-10 | End: 2018-10-17

## 2018-10-10 RX ORDER — ONDANSETRON 4 MG/1
4 TABLET, FILM COATED ORAL EVERY 8 HOURS PRN
COMMUNITY
End: 2018-10-11

## 2018-10-10 ASSESSMENT — ANXIETY QUESTIONNAIRES
6. BECOMING EASILY ANNOYED OR IRRITABLE: NOT AT ALL
3. WORRYING TOO MUCH ABOUT DIFFERENT THINGS: NOT AT ALL
1. FEELING NERVOUS, ANXIOUS, OR ON EDGE: NOT AT ALL
7. FEELING AFRAID AS IF SOMETHING AWFUL MIGHT HAPPEN: NOT AT ALL
GAD7 TOTAL SCORE: 0
4. TROUBLE RELAXING: NOT AT ALL
2. NOT BEING ABLE TO STOP OR CONTROL WORRYING: NOT AT ALL
5. BEING SO RESTLESS THAT IT IS HARD TO SIT STILL: NOT AT ALL

## 2018-10-10 ASSESSMENT — PAIN SCALES - GENERAL
PAINLEVEL: NO PAIN (0)
PAINLEVEL: NO PAIN (0)

## 2018-10-10 NOTE — MR AVS SNAPSHOT
After Visit Summary   10/10/2018    Vida Mederos    MRN: [id number]           Patient Information     Date Of Birth          1975        Visit Information        Provider Department      10/10/2018 3:45 PM Lisa May PA-C [facility]        Today's Diagnoses     Ceruminosis, right    -  1    Irritation of external ear canal, right        Vertigo        BPPV (benign paroxysmal positional vertigo), right          Care Instructions    Ear- right was cleaned. Scant skin (epithelum) remaining over right ear.   Use ear drops for 1 week and return for cleaning w/ audiogram    Start Cawthrone exercises.   If no improvement- consider repeat Physical therapy and neck  Use Zofran as needed for nausea  Valium one tablet for severe vertigo as needed.     Thank you for allowing [facility] and our ENT team to participate in your care.  If your medications are too expensive, please give the nurse a call.  We can possibly change this medication.  If you have a scheduling or an appointment question please contact our Health Unit Coordinator at their direct line [phone].   ALL nursing questions or concerns can be directed to your ENT nurse at: [phone] [facility]              Follow-ups after your visit        Who to contact     If you have questions or need follow up information about today's clinic visit or your schedule please contact [facility] directly at [phone].  Normal or non-critical lab and imaging results will be communicated to you by MyChart, letter or phone within 4 business days after the clinic has received the results. If you do not hear from us within 7 days, please contact the clinic through MyChart or phone. If you have a critical or abnormal lab result, we will notify you by phone as soon as possible.  Submit refill requests through [employer] or call your pharmacy and they will forward the refill request to us. Please allow 3 business  "days for your refill to be completed.          Additional Information About Your Visit        [employer] Information     [employer] lets you send messages to your doctor, view your test results, renew your prescriptions, schedule appointments and more. To sign up, go to [email or web address] . Click on \"Log in\" on the left side of the screen, which will take you to the Welcome page. Then click on \"Sign up Now\" on the right side of the page.     You will be asked to enter the access code listed below, as well as some personal information. Please follow the directions to create your username and password.     Your access code is: [email or web address]  Expires: 2019  3:27 AM     Your access code will  in 90 days. If you need help or a new code, please call your [facility] clinic or [phone].        Care EveryWhere ID     This is your Care EveryWhere ID. This could be used by other organizations to access your [facility] medical records  [id number]        Your Vitals Were     Pulse Temperature Height BMI (Body Mass Index)          73 97.4  F (36.3  C) (Tympanic) 5' 4.5\" (1.638 m) 30.08 kg/m2         Blood Pressure from Last 3 Encounters:   10/10/18 118/70   10/10/18 108/64   10/06/18 110/71    Weight from Last 3 Encounters:   10/10/18 178 lb (80.7 kg)   10/10/18 178 lb (80.7 kg)   10/05/18 168 lb (76.2 kg)              Today, you had the following     No orders found for display         Today's Medication Changes          These changes are accurate as of 10/10/18  4:20 PM.  If you have any questions, ask your nurse or doctor.               Start taking these medicines.        Dose/Directions    diazepam 2 MG tablet   Commonly known as:  VALIUM   Used for:  BPPV (benign paroxysmal positional vertigo), right, Vertigo   Started by:  Lisa May PA-C        Dose:  2 mg   Take 1 tablet (2 mg) by mouth every 8 hours as needed (vertigo)   Quantity:  20 tablet   Refills:  0            Where to get your medicines      Some of " these will need a paper prescription and others can be bought over the counter.  Ask your nurse if you have questions.     Bring a paper prescription for each of these medications     diazepam 2 MG tablet                Primary Care Provider Office Phone # Fax #    Razia Levy [phone]       [facility] [place] [address]  [facility] [id number]        Equal Access to Services     [facility] HospitalLA[facility] : Hadii aad ku hadasho Soomaali, waaxda luqadaha, qaybta kaalmada adeegyada, waxay idiin hayaan adeeg kharash la'aan ah. So [facility] [phone].    ATENCIÓN: Si habla español, tiene a rubio disposición servicios gratuitos de asistencia lingüística. Llame al [phone].    We comply with applicable federal civil rights laws and [place] laws. We do not discriminate on the basis of race, color, national origin, age, disability, sex, sexual orientation, or gender identity.            Thank you!     Thank you for choosing [facility]  for your care. Our goal is always to provide you with excellent care. Hearing back from our patients is one way we can continue to improve our services. Please take a few minutes to complete the written survey that you may receive in the mail after your visit with us. Thank you!             Your Updated Medication List - Protect others around you: Learn how to safely use, store and throw away your medicines at www.disposemymeds.org.          This list is accurate as of 10/10/18  4:20 PM.  Always use your most recent med list.                   Brand Name Dispense Instructions for use Diagnosis    BENADRYL PO      Take 25 mg by mouth        diazepam 2 MG tablet    VALIUM    20 tablet    Take 1 tablet (2 mg) by mouth every 8 hours as needed (vertigo)    BPPV (benign paroxysmal positional vertigo), right, Vertigo       fluticasone 50 MCG/ACT spray    FLONASE    1 Bottle    Spray 1-2 sprays into both nostrils daily        IBUPROFEN PO      Take 400 mg by  mouth as needed for moderate pain        ZOFRAN 4 MG tablet   Generic drug:  ondansetron      Take 4 mg by mouth every 8 hours as needed for nausea

## 2018-10-10 NOTE — NURSING NOTE
"Chief Complaint   Patient presents with     Cerumen Impaction     Pt has been referred by Cam for cerumen impaction.  Pt was just seen and had an ear wash.       Initial /70 (BP Location: Right arm, Cuff Size: Adult Regular)  Pulse 73  Temp 97.4  F (36.3  C) (Tympanic)  Ht 5' 4.5\" (1.638 m)  Wt 178 lb (80.7 kg)  BMI 30.08 kg/m2 Estimated body mass index is 30.08 kg/(m^2) as calculated from the following:    Height as of this encounter: 5' 4.5\" (1.638 m).    Weight as of this encounter: 178 lb (80.7 kg).  Medication Reconciliation: complete    Rufina Roper LPN    "

## 2018-10-10 NOTE — MR AVS SNAPSHOT
After Visit Summary   10/10/2018    Vida Mederos    MRN: [id number]           Patient Information     Date Of Birth          1975        Visit Information        Provider Department      10/10/2018 2:45 PM Razia Levy MD [facility]        Today's Diagnoses     Dizziness    -  1    Ceruminosis, right        Crusted tympanic membrane of right ear           Follow-ups after your visit        Additional Services     OTOLARYNGOLOGY REFERRAL       Patient hasn't slept well in 5 days,  She has cerumen almost 'melted' on TM on right covering about 50%    Your provider has referred you to: [facility]      Please be aware that coverage of these services is subject to the terms and limitations of your health insurance plan.  Call member services at your health plan with any benefit or coverage questions.      Please bring the following with you to your appointment:    (1) Any X-Rays, CTs or MRIs which have been performed.  Contact the facility where they were done to arrange for  prior to your scheduled appointment.   (2) List of current medications  (3) This referral request   (4) Any documents/labs given to you for this referral                  Your next 10 appointments already scheduled     Oct 23, 2018  9:00 AM CDT   (Arrive by 8:45 AM)   Return Visit with Lisa May PA-C   [facility]    [address] [place] [address]   [phone]            Oct 24, 2018  8:15 AM CDT   (Arrive by 8:00 AM)   Hearing Eval with Hadley Joiner   [facility]    [address] [place] [address]   [phone]              Who to contact     If you have questions or need follow up information about today's clinic visit or your schedule please contact [facility] directly at [phone].  Normal or non-critical lab and imaging results will  "be communicated to you by MyChart, letter or phone within 4 business days after the clinic has received the results. If you do not hear from us within 7 days, please contact the clinic through Nitro PDF or phone. If you have a critical or abnormal lab result, we will notify you by phone as soon as possible.  Submit refill requests through Nitro PDF or call your pharmacy and they will forward the refill request to us. Please allow 3 business days for your refill to be completed.          Additional Information About Your Visit        Nitro PDF Information     Nitro PDF lets you send messages to your doctor, view your test results, renew your prescriptions, schedule appointments and more. To sign up, go to [email or web address] . Click on \"Log in\" on the left side of the screen, which will take you to the Welcome page. Then click on \"Sign up Now\" on the right side of the page.     You will be asked to enter the access code listed below, as well as some personal information. Please follow the directions to create your username and password.     Your access code is: [email or web address]  Expires: 2019  3:27 AM     Your access code will  in 90 days. If you need help or a new code, please call your [facility] clinic or [phone].        Care EveryWhere ID     This is your Care EveryWhere ID. This could be used by other organizations to access your [facility] medical records  [id number]        Your Vitals Were     Pulse Temperature Pulse Oximetry BMI (Body Mass Index)          66 97.8  F (36.6  C) (Tympanic) 98% 30.55 kg/m2         Blood Pressure from Last 3 Encounters:   10/10/18 118/70   10/10/18 108/64   10/06/18 110/71    Weight from Last 3 Encounters:   10/10/18 178 lb (80.7 kg)   10/10/18 178 lb (80.7 kg)   10/05/18 168 lb (76.2 kg)              We Performed the Following     OTOLARYNGOLOGY REFERRAL          Today's Medication Changes          These changes are accurate as of 10/10/18  5:18 PM.  If you have any " questions, ask your nurse or doctor.               Start taking these medicines.        Dose/Directions    diazepam 2 MG tablet   Commonly known as:  VALIUM   Used for:  BPPV (benign paroxysmal positional vertigo), right, Vertigo   Started by:  Lisa May PA-C        Dose:  2 mg   Take 1 tablet (2 mg) by mouth every 8 hours as needed (vertigo)   Quantity:  20 tablet   Refills:  0       ofloxacin 0.3 % otic solution   Commonly known as:  FLOXIN   Used for:  Irritation of external ear canal, right   Started by:  Lisa May PA-C        Dose:  10 drop   Place 10 drops into the right ear 2 times daily for 7 days   Quantity:  10 mL   Refills:  0            Where to get your medicines      These medications were sent to [id number] - [place], [place] - [address]   [id number] , [place] [address]     Phone:  [phone]     ofloxacin 0.3 % otic solution         Some of these will need a paper prescription and others can be bought over the counter.  Ask your nurse if you have questions.     Bring a paper prescription for each of these medications     diazepam 2 MG tablet                Primary Care Provider Office Phone # Fax #    Razia Levy [phone]       [facility] [place] [address] [facility]  [place] [address]        Equal Access to Services     TRISH VILLEGAS AH: Hadii aad ku hadasho Soomaali, waaxda luqadaha, qaybta kaalmada adeegyada, waxay idiin haytianan elsi seo. So [facility] [phone].    ATENCIÓN: Si habla español, tiene a rubio disposición servicios gratuitos de asistencia lingüística. Llame al [phone].    We comply with applicable federal civil rights laws and [place] laws. We do not discriminate on the basis of race, color, national origin, age, disability, sex, sexual orientation, or gender identity.            Thank you!     Thank you for choosing [facility]  for your care. Our goal is always to provide you with excellent care. Hearing  back from our patients is one way we can continue to improve our services. Please take a few minutes to complete the written survey that you may receive in the mail after your visit with us. Thank you!             Your Updated Medication List - Protect others around you: Learn how to safely use, store and throw away your medicines at www.disposemymeds.org.          This list is accurate as of 10/10/18  5:18 PM.  Always use your most recent med list.                   Brand Name Dispense Instructions for use Diagnosis    BENADRYL PO      Take 25 mg by mouth        diazepam 2 MG tablet    VALIUM    20 tablet    Take 1 tablet (2 mg) by mouth every 8 hours as needed (vertigo)    BPPV (benign paroxysmal positional vertigo), right, Vertigo       fluticasone 50 MCG/ACT spray    FLONASE    1 Bottle    Spray 1-2 sprays into both nostrils daily        IBUPROFEN PO      Take 400 mg by mouth as needed for moderate pain        ofloxacin 0.3 % otic solution    FLOXIN    10 mL    Place 10 drops into the right ear 2 times daily for 7 days    Irritation of external ear canal, right       ZOFRAN 4 MG tablet   Generic drug:  ondansetron      Take 4 mg by mouth every 8 hours as needed for nausea

## 2018-10-10 NOTE — NURSING NOTE
"No chief complaint on file.      Initial /64  Pulse 66  Temp 97.8  F (36.6  C) (Tympanic)  Wt 178 lb (80.7 kg)  SpO2 98%  BMI 30.55 kg/m2 Estimated body mass index is 30.55 kg/(m^2) as calculated from the following:    Height as of 1/19/17: 5' 4\" (1.626 m).    Weight as of this encounter: 178 lb (80.7 kg).  Medication Reconciliation: complete    Natalia Aquino MA      "

## 2018-10-10 NOTE — PATIENT INSTRUCTIONS
Ear- right was cleaned. Scant skin (epithelum) remaining over right ear.   Use ear drops for 1 week and return for cleaning w/ audiogram    Start Cawthrone exercises.   If no improvement- consider repeat Physical therapy and neck  Use Zofran as needed for nausea  Valium one tablet for severe vertigo as needed.     Thank you for allowing MITRA Syed and our ENT team to participate in your care.  If your medications are too expensive, please give the nurse a call.  We can possibly change this medication.  If you have a scheduling or an appointment question please contact our Health Unit Coordinator at their direct line [phone].   ALL nursing questions or concerns can be directed to your ENT nurse at: [phone] jasmine

## 2018-10-10 NOTE — PROGRESS NOTES
Chief Complaint   Patient presents with     Cerumen Impaction     Pt has been referred by Cam for cerumen impaction.  Pt was just seen and had an ear wash.       Vida presents for concerns of vertigo and ear cleaning.   Vertigo developed about 1 week ago and had associated nausea/ emesis.   She notes dizziness upon supine position worse and increase in symptoms with head movement to RIGHT.   Hx of vertigo this year. She was seen in ED for vertigo on a couple occasions.   Denies fluctuating hearing loss. Mild tinnitus at times.  Vida describes the spinning lasts for about a few minutes and then resolves. Provoked by head movements.   She did see PT but was negative for Chriss Hallpike.     Her first episode was in May. She had some mild dizziness during those times.   Denies aural fullness or fluctuating HL.   Denies COM or otlogic surgeries  Denies otalgia, otorrhea.   No recent audiogram.   MRI of Brain if normal. Negative for IAC lesion  Negative family hx of ear complaints    Past Medical History:   Diagnosis Date     BPPV (benign paroxysmal positional vertigo) 06/2018     Concussion 08/2018     Gestational diabetes      Lumbar degenerative disc disease 2011    had fallen and was hospitalized for pain     Obesity      Threatened premature labor, antepartum(644.03) 8/23/2008     Twin pregnancy, antepartum condition or complication 3/31/2008       Current Outpatient Rx   Medication Sig Dispense Refill     diazepam (VALIUM) 2 MG tablet Take 1 tablet (2 mg) by mouth every 8 hours as needed (vertigo) 20 tablet 0     DiphenhydrAMINE HCl (BENADRYL PO) Take 25 mg by mouth       fluticasone (FLONASE) 50 MCG/ACT spray Spray 1-2 sprays into both nostrils daily 1 Bottle 11     IBUPROFEN PO Take 400 mg by mouth as needed for moderate pain       ondansetron (ZOFRAN) 4 MG tablet Take 4 mg by mouth every 8 hours as needed for nausea         Allergies: Review of patient's allergies indicates no known allergies.       Past  "Surgical History:   Procedure Laterality Date      SECTION  2008      SECTION       D & C      3 months SAB       ENT family history reviewed    Social History   Substance Use Topics     Smoking status: Never Smoker     Smokeless tobacco: Never Used     Alcohol use 0.0 oz/week     0 Standard drinks or equivalent per week      Comment: twice a month       Review of Systems  ROS: 10 point ROS neg other than the symptoms noted above in the HPI     Physical Exam  /70 (BP Location: Right arm, Cuff Size: Adult Regular)  Pulse 73  Temp 97.4  F (36.3  C) (Tympanic)  Ht 5' 4.5\" (1.638 m)  Wt 178 lb (80.7 kg)  BMI 30.08 kg/m2  General - The patient is well nourished and well developed, and appears to have good nutritional status.  Alert and oriented to person and place, answers questions and cooperates with examination appropriately.   Head and Face - Normocephalic and atraumatic, with no gross asymmetry noted.  The facial nerve is intact, with strong symmetric movements.  Voice and Breathing - The patient was breathing comfortably without the use of accessory muscles. There was no wheezing, stridor, or stertor.  The patients voice was clear and strong, and had appropriate pitch and quality.  Ears -The external auditory canals- Left clear. Right canal with moisture and cerumen. There is keratin/ epithelium debris along Right TM.     The ear canals were examined underneath the operating microscope and with an otologic speculum.  The canals were cleaned of all debris with gently suctioning and use of cupped forceps.   There is no granulation tissue or sign of cholesteatoma.  The patient tolerates this fairly well.   There is scant epithelium remaining along anterior quadrants RIGHT. Left ear is clear and TM appears normal. No effusions bilaterally.     Eyes - Extraocular movements intact, and the pupils were reactive to light.  Sclera were not icteric or injected, conjunctiva were pink and " moist.  Mouth - Examination of the oral cavity showed pink, healthy oral mucosa. No lesions or ulcerations noted.  The tongue was mobile and midline, and the dentition were in good condition.    Throat - The walls of the oropharynx were smooth, pink, moist, symmetric, and had no lesions or ulcerations.  The tonsillar pillars and soft palate were symmetric.  The uvula was midline on elevation.    Neck - Normal midline excursion of the laryngotracheal complex during swallowing.  Full range of motion on passive movement.  Palpation of the occipital, submental, submandibular, internal jugular chain, and supraclavicular nodes did not demonstrate any abnormal lymph nodes or masses.  Palpation of the thyroid was soft and smooth, with no nodules or goiter appreciated.  The trachea was mobile and midline.  Nose - External contour is symmetric, no gross deflection or scars.  Nasal mucosa is pink and moist with no abnormal mucus.  The septum and turbinates were evaluated: No polyps, masses, or purulence noted on examination  NEURO:   neg Romberg,CN intact, ambulates without difficulty.  no focal deficits noted.  Chriss Hallpike is negative for nystagmus or symptoms.       ASSESSMENT:    ICD-10-CM    1. Ceruminosis, right H61.21    2. Irritation of external ear canal, right H61.891 ofloxacin (FLOXIN) 0.3 % otic solution   3. Vertigo R42 diazepam (VALIUM) 2 MG tablet   4. BPPV (benign paroxysmal positional vertigo), right H81.11 diazepam (VALIUM) 2 MG tablet         Discussed use of otics, there is a scant film remaining on TM. Overall ear appears clear. Will have her complete drops and return in 1-2 weeks for cleaning and audiogram.   She did have MRI of brain which was normal. Will have addendum of IAC.     Vida describes classic BPPV symptoms, however, I am not able to produce symptoms in office. Consider another VT session, possible horizontal?.   However, she will start Cawthorne symptoms and will monitor symptoms.   Valium  for severe vertigo and Zofran for nausea.     She has no headaches or migraines. However, she does have cervical/ neck complaints. If no improvement with above, consider PT for neck.     She agrees with this plan.     Based on today's exam and history, I think that the most likely diagnosis at this point is benign paroxysmal positional vertigo.  The etiology of benign paroxysmal positional vertigo being small crystals tumbling in the semicircular canals was discussed at length.  Also, the treatment of the problem with physical therapy for canalith repositioning maneuvers was discussed.    I will send the patient to physical therapy for this to be done.  The patient was very specifically instructed to return to me should the therapy prove unsuccessful.  In which case we will search for other possible causes, as well as discuss possible further imaging such as MRI.          Lisa May PA-C  ENT  [facility] [place]  [phone]

## 2018-10-10 NOTE — PROGRESS NOTES
SUBJECTIVE:                                                    Vida Mederos is a 43 year old female who presents to clinic today for the following health issues:        ED/UC Followup:    [place]  [facility] [place] of visit: 10-5-18  Reason for visit: benign paroxysmal positional vertigo  Current Status: is still very dizzy. When laying down its the worst. Sitting up straight kind of better. Medications not working.     She reports she has had this a few times, and PT did not help.    Problem list and histories reviewed & adjusted, as indicated.  Additional history: as documented    Patient Active Problem List   Diagnosis     ACP (advance care planning)     Pain in joint, multiple sites     Encounter to establish care     High triglycerides     Past Surgical History:   Procedure Laterality Date      SECTION  2008      SECTION       D & C      3 months SAB       Social History   Substance Use Topics     Smoking status: Never Smoker     Smokeless tobacco: Never Used     Alcohol use 0.0 oz/week     0 Standard drinks or equivalent per week      Comment: twice a month     Family History   Problem Relation Age of Onset     Diabetes Mother      Prostate Cancer Father      ? vs BPH     GERD Father      has lost teeth     Diabetes Maternal Grandmother      Diabetes Paternal Grandmother      Other Cancer Sister      abnormal cells?     Other - See Comments Sister      hyst     Family History Negative Sister      Family History Negative Sister      Family History Negative Brother      Family History Negative Brother          Current Outpatient Prescriptions   Medication Sig Dispense Refill     DiphenhydrAMINE HCl (BENADRYL PO) Take 25 mg by mouth       fluticasone (FLONASE) 50 MCG/ACT spray Spray 1-2 sprays into both nostrils daily 1 Bottle 11     IBUPROFEN PO Take 400 mg by mouth as needed for moderate pain       ondansetron (ZOFRAN) 4 MG tablet Take 4 mg by mouth every 8 hours as needed for nausea        diazepam (VALIUM) 2 MG tablet Take 1 tablet (2 mg) by mouth every 8 hours as needed (vertigo) 20 tablet 0     ofloxacin (FLOXIN) 0.3 % otic solution Place 10 drops into the right ear 2 times daily for 7 days 10 mL 0     No Known Allergies  BP Readings from Last 3 Encounters:   10/10/18 118/70   10/10/18 108/64   10/06/18 110/71    Wt Readings from Last 3 Encounters:   10/10/18 178 lb (80.7 kg)   10/10/18 178 lb (80.7 kg)   10/05/18 168 lb (76.2 kg)                  Labs reviewed in EPIC    ROS:  Constitutional, HEENT, cardiovascular, pulmonary, gi and gu systems are negative, except as otherwise noted.    OBJECTIVE:                                                    /64  Pulse 66  Temp 97.8  F (36.6  C) (Tympanic)  Wt 178 lb (80.7 kg)  SpO2 98%  BMI 30.55 kg/m2  Body mass index is 30.55 kg/(m^2).  GENERAL APPEARANCE: healthy, alert and no distress  HENT: nose and mouth without ulcers or lesions and right TM reveals thin film of cerumen on anterior aspect  NECK: no adenopathy, no asymmetry, masses, or scars and thyroid normal to palpation  RESP: lungs clear to auscultation - no rales, rhonchi or wheezes  CV: regular rates and rhythm, normal S1 S2, no S3 or S4 and no murmur, click or rub  PSYCH: mentation appears normal and affect normal/bright       ASSESSMENT/PLAN:                                                    1. Dizziness  May be second to cerumen on TM, BPPV?  - OTOLARYNGOLOGY REFERRAL    2. Ceruminosis, right    - OTOLARYNGOLOGY REFERRAL    3. Crusted tympanic membrane of right ear  She was able to get in today, I appreciate ENTs help in seeing this nice lady.  - OTOLARYNGOLOGY REFERRAL    Patient was agreeable to this plan and had no further questions.  See Patient Instructions    Razia Levy MD  [facility]

## 2018-10-10 NOTE — LETTER
10/10/2018         RE: Vida Mederos  [address]  [place] [address]        Dear Colleague,    Thank you for referring your patient, Vida Mederos, to the [facility] [place]. Please see a copy of my visit note below.    Chief Complaint   Patient presents with     Cerumen Impaction     Pt has been referred by Cam for cerumen impaction.  Pt was just seen and had an ear wash.       Vida presents for concerns of vertigo and ear cleaning.   Vertigo developed about 1 week ago and had associated nausea/ emesis.   She notes dizziness upon supine position worse and increase in symptoms with head movement to RIGHT.   Hx of vertigo this year. She was seen in ED for vertigo on a couple occasions.   Denies fluctuating hearing loss. Mild tinnitus at times.  Vida describes the spinning lasts for about a few minutes and then resolves. Provoked by head movements.   She did see PT but was negative for Chriss Hallpike.     Her first episode was in May. She had some mild dizziness during those times.   Denies aural fullness or fluctuating HL.   Denies COM or otlogic surgeries  Denies otalgia, otorrhea.   No recent audiogram.   MRI of Brain if normal. Negative for IAC lesion  Negative family hx of ear complaints    Past Medical History:   Diagnosis Date     BPPV (benign paroxysmal positional vertigo) 06/2018     Concussion 08/2018     Gestational diabetes      Lumbar degenerative disc disease 2011    had fallen and was hospitalized for pain     Obesity      Threatened premature labor, antepartum(644.03) 8/23/2008     Twin pregnancy, antepartum condition or complication 3/31/2008       Current Outpatient Rx   Medication Sig Dispense Refill     diazepam (VALIUM) 2 MG tablet Take 1 tablet (2 mg) by mouth every 8 hours as needed (vertigo) 20 tablet 0     DiphenhydrAMINE HCl (BENADRYL PO) Take 25 mg by mouth       fluticasone (FLONASE) 50 MCG/ACT spray Spray 1-2 sprays into both nostrils daily 1 Bottle 11      "IBUPROFEN PO Take 400 mg by mouth as needed for moderate pain       ondansetron (ZOFRAN) 4 MG tablet Take 4 mg by mouth every 8 hours as needed for nausea         Allergies: Review of patient's allergies indicates no known allergies.       Past Surgical History:   Procedure Laterality Date      SECTION  2008      SECTION       D & C  1999    3 months SAB       ENT family history reviewed    Social History   Substance Use Topics     Smoking status: Never Smoker     Smokeless tobacco: Never Used     Alcohol use 0.0 oz/week     0 Standard drinks or equivalent per week      Comment: twice a month       Review of Systems  ROS: 10 point ROS neg other than the symptoms noted above in the HPI     Physical Exam  /70 (BP Location: Right arm, Cuff Size: Adult Regular)  Pulse 73  Temp 97.4  F (36.3  C) (Tympanic)  Ht 5' 4.5\" (1.638 m)  Wt 178 lb (80.7 kg)  BMI 30.08 kg/m2  General - The patient is well nourished and well developed, and appears to have good nutritional status.  Alert and oriented to person and place, answers questions and cooperates with examination appropriately.   Head and Face - Normocephalic and atraumatic, with no gross asymmetry noted.  The facial nerve is intact, with strong symmetric movements.  Voice and Breathing - The patient was breathing comfortably without the use of accessory muscles. There was no wheezing, stridor, or stertor.  The patients voice was clear and strong, and had appropriate pitch and quality.  Ears -The external auditory canals- Left clear. Right canal with moisture and cerumen. There is keratin/ epithelium debris along Right TM.     The ear canals were examined underneath the operating microscope and with an otologic speculum.  The canals were cleaned of all debris with gently suctioning and use of cupped forceps.   There is no granulation tissue or sign of cholesteatoma.  The patient tolerates this fairly well.   There is scant epithelium " remaining along anterior quadrants RIGHT. Left ear is clear and TM appears normal. No effusions bilaterally.     Eyes - Extraocular movements intact, and the pupils were reactive to light.  Sclera were not icteric or injected, conjunctiva were pink and moist.  Mouth - Examination of the oral cavity showed pink, healthy oral mucosa. No lesions or ulcerations noted.  The tongue was mobile and midline, and the dentition were in good condition.    Throat - The walls of the oropharynx were smooth, pink, moist, symmetric, and had no lesions or ulcerations.  The tonsillar pillars and soft palate were symmetric.  The uvula was midline on elevation.    Neck - Normal midline excursion of the laryngotracheal complex during swallowing.  Full range of motion on passive movement.  Palpation of the occipital, submental, submandibular, internal jugular chain, and supraclavicular nodes did not demonstrate any abnormal lymph nodes or masses.  Palpation of the thyroid was soft and smooth, with no nodules or goiter appreciated.  The trachea was mobile and midline.  Nose - External contour is symmetric, no gross deflection or scars.  Nasal mucosa is pink and moist with no abnormal mucus.  The septum and turbinates were evaluated: No polyps, masses, or purulence noted on examination  NEURO:   neg Romberg,CN intact, ambulates without difficulty.  no focal deficits noted.  [place] Hallpike is negative for nystagmus or symptoms.       ASSESSMENT:    ICD-10-CM    1. Ceruminosis, right H61.21    2. Irritation of external ear canal, right H61.891 ofloxacin (FLOXIN) 0.3 % otic solution   3. Vertigo R42 diazepam (VALIUM) 2 MG tablet   4. BPPV (benign paroxysmal positional vertigo), right H81.11 diazepam (VALIUM) 2 MG tablet         Discussed use of otics, there is a scant film remaining on TM. Overall ear appears clear. Will have her complete drops and return in 1-2 weeks for cleaning and audiogram.   She did have MRI of brain which was normal. Will  have addendum of IAC.     Vida describes classic BPPV symptoms, however, I am not able to produce symptoms in office. Consider another VT session, possible horizontal?.   However, she will start [place] symptoms and will monitor symptoms.   Valium for severe vertigo and Zofran for nausea.     She has no headaches or migraines. However, she does have cervical/ neck complaints. If no improvement with above, consider PT for neck.     She agrees with this plan.     Based on today's exam and history, I think that the most likely diagnosis at this point is benign paroxysmal positional vertigo.  The etiology of benign paroxysmal positional vertigo being small crystals tumbling in the semicircular canals was discussed at length.  Also, the treatment of the problem with physical therapy for canalith repositioning maneuvers was discussed.    I will send the patient to physical therapy for this to be done.  The patient was very specifically instructed to return to me should the therapy prove unsuccessful.  In which case we will search for other possible causes, as well as discuss possible further imaging such as MRI.          Lisa May PA-C  ENT  [facility] [place]  [phone]        Again, thank you for allowing me to participate in the care of your patient.        Sincerely,        Lisa May PA-C

## 2018-10-11 DIAGNOSIS — R42 DIZZINESS: Primary | ICD-10-CM

## 2018-10-11 RX ORDER — ONDANSETRON 4 MG/1
4 TABLET, FILM COATED ORAL EVERY 8 HOURS PRN
Qty: 18 TABLET | Refills: 0 | Status: SHIPPED | OUTPATIENT
Start: 2018-10-11

## 2018-10-11 RX ORDER — MECLIZINE HYDROCHLORIDE 25 MG/1
TABLET ORAL
Qty: 30 TABLET | Refills: 1 | Status: SHIPPED | OUTPATIENT
Start: 2018-10-11

## 2018-10-11 ASSESSMENT — ANXIETY QUESTIONNAIRES: GAD7 TOTAL SCORE: 0

## 2018-10-11 ASSESSMENT — PATIENT HEALTH QUESTIONNAIRE - PHQ9: SUM OF ALL RESPONSES TO PHQ QUESTIONS 1-9: 0

## 2018-10-11 NOTE — TELEPHONE ENCOUNTER
ondansetron (ZOFRAN ODT) 4 MG ODT tab 10 tablet 0 10/6/2018 10/9/2018 --   Sig - Route: Take 1 tablet (4 mg) by mouth every 8 hours as needed for nausea - Oral   Class: E-Prescribe   Order: [id number]     meclizine (ANTIVERT) 12.5 MG tablet   Last Written Prescription Date:  10/6/18  Last Fill Quantity: 30,   # refills: 0  Last Office Visit: 10/10/18  Future Office visit:    Next 5 appointments (look out 90 days)     Oct 23, 2018  9:00 AM CDT   (Arrive by 8:45 AM)   Return Visit with Lisa May PA-C   [facility] [place] ([facility] )    [address] [place] [address] [place] [address]   [phone]                   Routing refill request to provider for review/approval because:  Drug not on the [facility], P or [facility] refill protocol or controlled substance  Discontinued by Dr. Levy 10/10/18    Pt called refill today, stated these were never filled during her appointment.

## 2018-10-23 ENCOUNTER — OFFICE VISIT (OUTPATIENT)
Dept: OTOLARYNGOLOGY | Facility: OTHER | Age: 43
End: 2018-10-23
Attending: PHYSICIAN ASSISTANT
Payer: COMMERCIAL

## 2018-10-23 ENCOUNTER — OFFICE VISIT (OUTPATIENT)
Dept: AUDIOLOGY | Facility: OTHER | Age: 43
End: 2018-10-23
Attending: AUDIOLOGIST
Payer: COMMERCIAL

## 2018-10-23 VITALS
WEIGHT: 178 LBS | SYSTOLIC BLOOD PRESSURE: 106 MMHG | DIASTOLIC BLOOD PRESSURE: 70 MMHG | OXYGEN SATURATION: 98 % | BODY MASS INDEX: 29.66 KG/M2 | HEIGHT: 65 IN | TEMPERATURE: 97.8 F | HEART RATE: 79 BPM

## 2018-10-23 DIAGNOSIS — H61.21 CERUMINOSIS, RIGHT: ICD-10-CM

## 2018-10-23 DIAGNOSIS — R42 VERTIGO: Primary | ICD-10-CM

## 2018-10-23 DIAGNOSIS — H90.3 SENSORINEURAL HEARING LOSS, ASYMMETRICAL: ICD-10-CM

## 2018-10-23 DIAGNOSIS — H81.11 BPPV (BENIGN PAROXYSMAL POSITIONAL VERTIGO), RIGHT: ICD-10-CM

## 2018-10-23 DIAGNOSIS — R42 DIZZINESS: ICD-10-CM

## 2018-10-23 PROCEDURE — 92504 EAR MICROSCOPY EXAMINATION: CPT

## 2018-10-23 PROCEDURE — 92504 EAR MICROSCOPY EXAMINATION: CPT | Performed by: PHYSICIAN ASSISTANT

## 2018-10-23 PROCEDURE — 92550 TYMPANOMETRY & REFLEX THRESH: CPT | Performed by: AUDIOLOGIST

## 2018-10-23 PROCEDURE — 99213 OFFICE O/P EST LOW 20 MIN: CPT | Mod: 25 | Performed by: PHYSICIAN ASSISTANT

## 2018-10-23 PROCEDURE — 92557 COMPREHENSIVE HEARING TEST: CPT | Performed by: AUDIOLOGIST

## 2018-10-23 PROCEDURE — G0463 HOSPITAL OUTPT CLINIC VISIT: HCPCS

## 2018-10-23 RX ORDER — CETIRIZINE HYDROCHLORIDE 10 MG/1
10 TABLET ORAL EVERY EVENING
Qty: 30 TABLET | Refills: 1 | Status: SHIPPED | OUTPATIENT
Start: 2018-10-23

## 2018-10-23 ASSESSMENT — PAIN SCALES - GENERAL: PAINLEVEL: NO PAIN (0)

## 2018-10-23 NOTE — MR AVS SNAPSHOT
After Visit Summary   10/23/2018    Vida Mederos    MRN: [id number]           Patient Information     Date Of Birth          1975        Visit Information        Provider Department      10/23/2018 9:00 AM Lisa May PA-C [facility]        Today's Diagnoses     Vertigo    -  1      Care Instructions    Right ear was cleaned today  Ear looks well.   Trial of Zyrtec one tablet at night.   Continue with good water/ smart water intake    Complete audiogram    Thank you for allowing MIT[facility] Syed and our ENT team to participate in your care.  If your medications are too expensive, please give the nurse a call.  We can possibly change this medication.  If you have a scheduling or an appointment question please contact our Health Unit Coordinator at their direct line [phone].   ALL nursing questions or concerns can be directed to your ENT nurse at: [phone] [place]              Follow-ups after your visit        Your next 10 appointments already scheduled     Oct 24, 2018  8:15 AM CDT   (Arrive by 8:00 AM)   H[facility] Eval with Hadley Joiner   [facility]    [address]  [facility] [address]   [phone]              Who to contact     If you have questions or need follow up information about today's clinic visit or your schedule please contact [facility] directly at [phone].  Normal or non-critical lab and imaging results will be communicated to you by MyChart, letter or phone within 4 business days after the clinic has received the results. If you do not hear from us within 7 days, please contact the clinic through MyChart or phone. If you have a critical or abnormal lab result, we will notify you by phone as soon as possible.  Submit refill requests through [employer] or call your pharmacy and they will forward the refill request to us. Please allow 3 business days for your  "refill to be completed.          Additional Information About Your Visit        Counsyl Information     Counsyl lets you send messages to your doctor, view your test results, renew your prescriptions, schedule appointments and more. To sign up, go to [email or web address] . Click on \"Log in\" on the left side of the screen, which will take you to the Welcome page. Then click on \"Sign up Now\" on the right side of the page.     You will be asked to enter the access code listed below, as well as some personal information. Please follow the directions to create your username and password.     Your access code is: [email or web address]  Expires: 2019  3:27 AM     Your access code will  in 90 days. If you need help or a new code, please call your [place] clinic or [phone].        Care EveryWhere ID     This is your Care EveryWhere ID. This could be used by other organizations to access your [place] medical records  [id number]        Your Vitals Were     Pulse Temperature Height Pulse Oximetry BMI (Body Mass Index)       79 97.8  F (36.6  C) (Tympanic) 1.638 m (5' 4.5\") 98% 30.08 kg/m2        Blood Pressure from Last 3 Encounters:   10/23/18 106/70   10/10/18 118/70   10/10/18 108/64    Weight from Last 3 Encounters:   10/23/18 80.7 kg (178 lb)   10/10/18 80.7 kg (178 lb)   10/10/18 80.7 kg (178 lb)              Today, you had the following     No orders found for display         Today's Medication Changes          These changes are accurate as of 10/23/18  9:17 AM.  If you have any questions, ask your nurse or doctor.               Start taking these medicines.        Dose/Directions    cetirizine 10 MG tablet   Commonly known as:  zyrTEC   Used for:  Vertigo   Started by:  Lisa May PA-C        Dose:  10 mg   Take 1 tablet (10 mg) by mouth every evening   Quantity:  30 tablet   Refills:  1            Where to get your medicines      These medications were sent to [id number] [address] [address]  [address] [place] [address]     Phone:  [phone]     cetirizine 10 MG tablet                Primary Care Provider Office Phone # Fax #    Razia Levy [phone]       [facility] [place] [facility] [place] [address]  [facility] [address]        Equal Access to Services     [facility] CenterLA[facility] : Hadii aad ku hadasho Soomaali, waaxda luqadaha, qaybta kaalmada adeegyada, waxay idiin hayaan adeeg kharash la'aan . So [facility] [phone].    ATENCIÓN: Si habla español, tiene a rubio disposición servicios gratuitos de asistencia lingüística. Llame al [phone].    We comply with applicable federal civil rights laws and [place] laws. We do not discriminate on the basis of race, color, national origin, age, disability, sex, sexual orientation, or gender identity.            Thank you!     Thank you for choosing [facility]  for your care. Our goal is always to provide you with excellent care. Hearing back from our patients is one way we can continue to improve our services. Please take a few minutes to complete the written survey that you may receive in the mail after your visit with us. Thank you!             Your Updated Medication List - Protect others around you: Learn how to safely use, store and throw away your medicines at www.disposemymeds.org.          This list is accurate as of 10/23/18  9:17 AM.  Always use your most recent med list.                   Brand Name Dispense Instructions for use Diagnosis    BENADRYL PO      Take 25 mg by mouth        cetirizine 10 MG tablet    zyrTEC    30 tablet    Take 1 tablet (10 mg) by mouth every evening    Vertigo       diazepam 2 MG tablet    VALIUM    20 tablet    Take 1 tablet (2 mg) by mouth every 8 hours as needed (vertigo)    BPPV (benign paroxysmal positional vertigo), right, Vertigo       fluticasone 50 MCG/ACT spray    FLONASE    1 Bottle    Spray 1-2 sprays into both nostrils daily        IBUPROFEN PO      Take 400 mg by  mouth as needed for moderate pain        meclizine 25 MG tablet    ANTIVERT    30 tablet    Take one tablet three times a day as needed for dizziness.    Dizziness       ondansetron 4 MG tablet    ZOFRAN    18 tablet    Take 1 tablet (4 mg) by mouth every 8 hours as needed for nausea    Dizziness

## 2018-10-23 NOTE — NURSING NOTE
"Chief Complaint   Patient presents with     RECHECK     follow up ear cleaning, follow up for vertigo and BPPV       Initial /70 (BP Location: Right arm, Cuff Size: Adult Large)  Pulse 79  Temp 97.8  F (36.6  C) (Tympanic)  Ht 1.638 m (5' 4.5\")  Wt 80.7 kg (178 lb)  SpO2 98%  BMI 30.08 kg/m2 Estimated body mass index is 30.08 kg/(m^2) as calculated from the following:    Height as of this encounter: 1.638 m (5' 4.5\").    Weight as of this encounter: 80.7 kg (178 lb).  Medication Reconciliation: complete    Lori Luna LPN  "

## 2018-10-23 NOTE — MR AVS SNAPSHOT
"              After Visit Summary   10/23/2018    Vida Mederos    MRN: [id number]           Patient Information     Date Of Birth          1975        Visit Information        Provider Department      10/23/2018 9:15 AM [place] [facility]        Today's Diagnoses     Sensorineural hearing loss, asymmetrical        Dizziness           Follow-ups after your visit        Who to contact     If you have questions or need follow up information about today's clinic visit or your schedule please contact [facility] directly at [phone].  Normal or non-critical lab and imaging results will be communicated to you by Recipharmhart, letter or phone within 4 business days after the clinic has received the results. If you do not hear from us within 7 days, please contact the clinic through Recipharmhart or phone. If you have a critical or abnormal lab result, we will notify you by phone as soon as possible.  Submit refill requests through [employer] or call your pharmacy and they will forward the refill request to us. Please allow 3 business days for your refill to be completed.          Additional Information About Your Visit        MyChart Information     Cognitive Match lets you send messages to your doctor, view your test results, renew your prescriptions, schedule appointments and more. To sign up, go to [email or web address] . Click on \"Log in\" on the left side of the screen, which will take you to the Welcome page. Then click on \"Sign up Now\" on the right side of the page.     You will be asked to enter the access code listed below, as well as some personal information. Please follow the directions to create your username and password.     Your access code is: [email or web address]  Expires: 2019  3:27 AM     Your access code will  in 90 days. If you need help or a new code, please call your [place] clinic or [phone].        Care EveryWhere ID     This is your Care " EveryWhere ID. This could be used by other organizations to access your [place] medical records  [id number]         Blood Pressure from Last 3 Encounters:   10/23/18 106/70   10/10/18 118/70   10/10/18 108/64    Weight from Last 3 Encounters:   10/23/18 178 lb (80.7 kg)   10/10/18 178 lb (80.7 kg)   10/10/18 178 lb (80.7 kg)              We Performed the Following     AUDIOGRAM/TYMPANOGRAM - INTERFACE          Today's Medication Changes          These changes are accurate as of 10/23/18  9:45 AM.  If you have any questions, ask your nurse or doctor.               Start taking these medicines.        Dose/Directions    cetirizine 10 MG tablet   Commonly known as:  zyrTEC   Used for:  Vertigo   Started by:  Lisa May PA-C        Dose:  10 mg   Take 1 tablet (10 mg) by mouth every evening   Quantity:  30 tablet   Refills:  1            Where to get your medicines      These medications were sent to [id number] - [place], [place] - [address]   [address] , [place] [address]     Phone:  [phone]     cetirizine 10 MG tablet                Primary Care Provider Office Phone # Fax #    Razia MADELYN Levy [phone]       [facility] [address] [place] [address]        Equal Access to Services     TRISH VILLEGAS AH: Hadii aad ku hadasho Soomaali, waaxda luqadaha, qaybta kaalmada adeegyada, marcela seo. So [facility] [phone].    ATENCIÓN: Si habla español, tiene a rubio disposición servicios gratuitos de asistencia lingüística. Llame al [phone].    We comply with applicable federal civil rights laws and [place] laws. We do not discriminate on the basis of race, color, national origin, age, disability, sex, sexual orientation, or gender identity.            Thank you!     Thank you for choosing [facility]  for your care. Our goal is always to provide you with excellent care. Hearing back from our patients is one way we can  continue to improve our services. Please take a few minutes to complete the written survey that you may receive in the mail after your visit with us. Thank you!             Your Updated Medication List - Protect others around you: Learn how to safely use, store and throw away your medicines at www.disposemymeds.org.          This list is accurate as of 10/23/18  9:45 AM.  Always use your most recent med list.                   Brand Name Dispense Instructions for use Diagnosis    BENADRYL PO      Take 25 mg by mouth        cetirizine 10 MG tablet    zyrTEC    30 tablet    Take 1 tablet (10 mg) by mouth every evening    Vertigo       diazepam 2 MG tablet    VALIUM    20 tablet    Take 1 tablet (2 mg) by mouth every 8 hours as needed (vertigo)    BPPV (benign paroxysmal positional vertigo), right, Vertigo       fluticasone 50 MCG/ACT spray    FLONASE    1 Bottle    Spray 1-2 sprays into both nostrils daily        IBUPROFEN PO      Take 400 mg by mouth as needed for moderate pain        meclizine 25 MG tablet    ANTIVERT    30 tablet    Take one tablet three times a day as needed for dizziness.    Dizziness       ondansetron 4 MG tablet    ZOFRAN    18 tablet    Take 1 tablet (4 mg) by mouth every 8 hours as needed for nausea    Dizziness

## 2018-10-23 NOTE — PATIENT INSTRUCTIONS
Right ear was cleaned today  Ear looks well.   Trial of Zyrtec one tablet at night.   Continue with good water/ smart water intake    Complete audiogram    Thank you for allowing [facility] Syed and our ENT team to participate in your care.  If your medications are too expensive, please give the nurse a call.  We can possibly change this medication.  If you have a scheduling or an appointment question please contact our Health Unit Coordinator at their direct line [phone].   ALL nursing questions or concerns can be directed to your ENT nurse at: [phone] [place]

## 2018-10-23 NOTE — LETTER
"    10/23/2018         RE: Vida Mederos  [address]  [place] [address]        Dear Colleague,    Thank you for referring your patient, Vida Mederos, to the [facility]. Please see a copy of my visit note below.    Chief Complaint   Patient presents with     RECHECK     follow up ear cleaning, follow up for vertigo and BPPV     Vida has stopped taking the valium and felt that her symptoms are improved.   She has felt improvement since changing her intake. She has added electrolytes water and felt improvement. She has continued sensation with her pressure in her head along in her head.   Her first episode was in May. She had some mild dizziness during those times.   Denies aural fullness or fluctuating HL.   Denies COM or otlogic surgeries  Denies otalgia, otorrhea.   No recent audiogram.   MRI of Brain if normal. Negative for IAC lesion  Negative family hx of ear complaints  Past Medical History:   Diagnosis Date     BPPV (benign paroxysmal positional vertigo) 06/2018     Concussion 08/2018     Gestational diabetes      Lumbar degenerative disc disease 2011    had fallen and was hospitalized for pain     Obesity      Threatened premature labor, antepartum(644.03) 8/23/2008     Twin pregnancy, antepartum condition or complication 3/31/2008      No Known Allergies  Current Outpatient Prescriptions   Medication     fluticasone (FLONASE) 50 MCG/ACT spray     diazepam (VALIUM) 2 MG tablet     DiphenhydrAMINE HCl (BENADRYL PO)     IBUPROFEN PO     meclizine (ANTIVERT) 25 MG tablet     ondansetron (ZOFRAN) 4 MG tablet     No current facility-administered medications for this visit.       ROS: 10 point ROS neg other than the symptoms noted above in the HPI.  /70 (BP Location: Right arm, Cuff Size: Adult Large)  Pulse 79  Temp 97.8  F (36.6  C) (Tympanic)  Ht 1.638 m (5' 4.5\")  Wt 80.7 kg (178 lb)  SpO2 98%  BMI 30.08 kg/m2  General - The patient is well nourished and well " developed, and appears to have good nutritional status.  Alert and oriented to person and place, answers questions and cooperates with examination appropriately.   Head and Face - Normocephalic and atraumatic, with no gross asymmetry noted.  The facial nerve is intact, with strong symmetric movements.  Voice and Breathing - The patient was breathing comfortably without the use of accessory muscles. There was no wheezing, stridor, or stertor.  The patients voice was clear and strong, and had appropriate pitch and quality.  Ears -The external auditory canals- Left clear. Right canal with moisture and cerumen. There is keratin/ epithelium debris along Right TM.      The ear canals were examined underneath the operating microscope and with an otologic speculum.  The canals were cleaned of all debris with gently suctioning and use of cupped forceps.   There is no granulation tissue or sign of cholesteatoma.  The patient tolerates this fairly well.   Removed debris from Right TM. Right TM normal, no effusions.  Left ear is clear and TM appears normal. No effusions bilaterally.      Eyes - Extraocular movements intact, and the pupils were reactive to light.  Sclera were not icteric or injected, conjunctiva were pink and moist.  Mouth - Examination of the oral cavity showed pink, healthy oral mucosa. No lesions or ulcerations noted.  The tongue was mobile and midline, and the dentition were in good condition.    Throat - The walls of the oropharynx were smooth, pink, moist, symmetric, and had no lesions or ulcerations.  The tonsillar pillars and soft palate were symmetric.  The uvula was midline on elevation.    Neck - Normal midline excursion of the laryngotracheal complex during swallowing.  Full range of motion on passive movement.  Palpation of the occipital, submental, submandibular, internal jugular chain, and supraclavicular nodes did not demonstrate any abnormal lymph nodes or masses.  Palpation of the thyroid was  soft and smooth, with no nodules or goiter appreciated.  The trachea was mobile and midline.  Nose - External contour is symmetric, no gross deflection or scars.  Nasal mucosa is pink and moist with no abnormal mucus.  The septum and turbinates were evaluated: No polyps, masses, or purulence noted on examination  NEURO:   neg Romberg,CN intact, ambulates without difficulty.  no focal deficits noted.    ASSESSMENT:    ICD-10-CM    1. Vertigo R42 cetirizine (ZYRTEC) 10 MG tablet   2. BPPV (benign paroxysmal positional vertigo), right H81.11    3. Ceruminosis, right H61.21        Right ear was cleaned today  Ear looks well.   Trial of Zyrtec one tablet at night.   Continue with good water/ smart water intake    Complete audiogram    Vida describes classic BPPV symptoms, however, I am not able to produce symptoms in office. Consider another VT session, possible horizontal?.   However, she will start Cawthorne symptoms and will monitor symptoms.   Valium for severe vertigo and Zofran for nausea.      She has no headaches or migraines. However, she does have cervical/ neck complaints. If no improvement with above, consider PT for neck.      She agrees with this plan.      Based on today's exam and history, I think that the most likely diagnosis at this point is benign paroxysmal positional vertigo.  The etiology of benign paroxysmal positional vertigo being small crystals tumbling in the semicircular canals was discussed at length.  Also, the treatment of the problem with physical therapy for canalith repositioning maneuvers was discussed.       Lisa May PA-C  ENT  [facility], [place]  [phone]      Again, thank you for allowing me to participate in the care of your patient.        Sincerely,        Lisa May PA-C

## 2018-10-23 NOTE — PROGRESS NOTES
"Chief Complaint   Patient presents with     RECHECK     follow up ear cleaning, follow up for vertigo and BPPV     Vida has stopped taking the valium and felt that her symptoms are improved.   She has felt improvement since changing her intake. She has added electrolytes water and felt improvement. She has continued sensation with her pressure in her head along in her head.   Her first episode was in May. She had some mild dizziness during those times.   Denies aural fullness or fluctuating HL.   Denies COM or otlogic surgeries  Denies otalgia, otorrhea.   No recent audiogram.   MRI of Brain if normal. Negative for IAC lesion  Negative family hx of ear complaints  Past Medical History:   Diagnosis Date     BPPV (benign paroxysmal positional vertigo) 06/2018     Concussion 08/2018     Gestational diabetes      Lumbar degenerative disc disease 2011    had fallen and was hospitalized for pain     Obesity      Threatened premature labor, antepartum(644.03) 8/23/2008     Twin pregnancy, antepartum condition or complication 3/31/2008      No Known Allergies  Current Outpatient Prescriptions   Medication     fluticasone (FLONASE) 50 MCG/ACT spray     diazepam (VALIUM) 2 MG tablet     DiphenhydrAMINE HCl (BENADRYL PO)     IBUPROFEN PO     meclizine (ANTIVERT) 25 MG tablet     ondansetron (ZOFRAN) 4 MG tablet     No current facility-administered medications for this visit.       ROS: 10 point ROS neg other than the symptoms noted above in the HPI.  /70 (BP Location: Right arm, Cuff Size: Adult Large)  Pulse 79  Temp 97.8  F (36.6  C) (Tympanic)  Ht 1.638 m (5' 4.5\")  Wt 80.7 kg (178 lb)  SpO2 98%  BMI 30.08 kg/m2  General - The patient is well nourished and well developed, and appears to have good nutritional status.  Alert and oriented to person and place, answers questions and cooperates with examination appropriately.   Head and Face - Normocephalic and atraumatic, with no gross asymmetry noted.  The facial " nerve is intact, with strong symmetric movements.  Voice and Breathing - The patient was breathing comfortably without the use of accessory muscles. There was no wheezing, stridor, or stertor.  The patients voice was clear and strong, and had appropriate pitch and quality.  Ears -The external auditory canals- Left clear. Right canal with moisture and cerumen. There is keratin/ epithelium debris along Right TM.      The ear canals were examined underneath the operating microscope and with an otologic speculum.  The canals were cleaned of all debris with gently suctioning and use of cupped forceps.   There is no granulation tissue or sign of cholesteatoma.  The patient tolerates this fairly well.   Removed debris from Right TM. Right TM normal, no effusions.  Left ear is clear and TM appears normal. No effusions bilaterally.      Eyes - Extraocular movements intact, and the pupils were reactive to light.  Sclera were not icteric or injected, conjunctiva were pink and moist.  Mouth - Examination of the oral cavity showed pink, healthy oral mucosa. No lesions or ulcerations noted.  The tongue was mobile and midline, and the dentition were in good condition.    Throat - The walls of the oropharynx were smooth, pink, moist, symmetric, and had no lesions or ulcerations.  The tonsillar pillars and soft palate were symmetric.  The uvula was midline on elevation.    Neck - Normal midline excursion of the laryngotracheal complex during swallowing.  Full range of motion on passive movement.  Palpation of the occipital, submental, submandibular, internal jugular chain, and supraclavicular nodes did not demonstrate any abnormal lymph nodes or masses.  Palpation of the thyroid was soft and smooth, with no nodules or goiter appreciated.  The trachea was mobile and midline.  Nose - External contour is symmetric, no gross deflection or scars.  Nasal mucosa is pink and moist with no abnormal mucus.  The septum and turbinates were  evaluated: No polyps, masses, or purulence noted on examination  NEURO:   neg Romberg,CN intact, ambulates without difficulty.  no focal deficits noted.    ASSESSMENT:    ICD-10-CM    1. Vertigo R42 cetirizine (ZYRTEC) 10 MG tablet   2. BPPV (benign paroxysmal positional vertigo), right H81.11    3. Ceruminosis, right H61.21        Right ear was cleaned today  Ear looks well.   Trial of Zyrtec one tablet at night.   Continue with good water/ smart water intake    Complete audiogram    Vida describes classic BPPV symptoms, however, I am not able to produce symptoms in office. Consider another VT session, possible horizontal?.   However, she will start C[place] symptoms and will monitor symptoms.   Valium for severe vertigo and Zofran for nausea.      She has no headaches or migraines. However, she does have cervical/ neck complaints. If no improvement with above, consider PT for neck.      She agrees with this plan.      Based on today's exam and history, I think that the most likely diagnosis at this point is benign paroxysmal positional vertigo.  The etiology of benign paroxysmal positional vertigo being small crystals tumbling in the semicircular canals was discussed at length.  Also, the treatment of the problem with physical therapy for canalith repositioning maneuvers was discussed.       Lisa May PA-C  ENT  [facility], [place]  [phone]

## 2018-10-23 NOTE — PROGRESS NOTES
Audiology Evaluation Completed. Please refer SCANNED AUDIOGRAM and/or TYMPANOGRAM for BACKGROUND, RESULTS, RECOMMENDATIONS.    UNDER RECOMMENDATIONS ON AUDIOGRAM PATIENT REFERRED TO ENT WITH SYMPTOMS      Prerna DE SANTIAGO, [facility]  Audiologist #[id number]        NO EPIC REFERRAL/ORDER NEEDED TO ENT BY AUDIOLOGY AS PATIENT ALREADY HAS AN APPOINTMENT WITH ENT

## 2018-10-24 NOTE — PROGRESS NOTES
Outpatient Physical Therapy Discharge Note     Patient: Vida Mederos  : 1975    Beginning/End Dates of Reporting Period:  2018 to 2018    Referring Provider: Alyse Kraft NP    Therapy Diagnosis: intermittent dizziness     Client Self Report: Pt seen for evaluation only    Objective Measurements:                                          Outcome Measures (most recent score):      Goals:  Goal Identifier STG 1   Goal Description Pt to be independent and compliant with HEP.   Target Date 18   Date Met      Progress:     Goal Identifier LTG 1   Goal Description Pt to be able to complete ADLs and functional activities without dizziness 90% of the time.   Target Date 18   Date Met      Progress:     Goal Identifier     Goal Description     Target Date     Date Met      Progress:     Goal Identifier     Goal Description     Target Date     Date Met      Progress:     Goal Identifier     Goal Description     Target Date     Date Met      Progress:     Goal Identifier     Goal Description     Target Date     Date Met      Progress:     Goal Identifier     Goal Description     Target Date     Date Met      Progress:     Goal Identifier     Goal Description     Target Date     Date Met      Progress:     Progress Toward Goals:   Pt seen for evaluation only: Pt may be presenting with a Meneire's type variant as well as demonstrating some sort of orthostatic or BP issues as well but no specific PT interventions indicated    Plan:  Discharge from therapy.    Discharge:    Reason for Discharge: No further expectation of progress.    Equipment Issued:     Discharge Plan: no skilled PT intervention indicated